# Patient Record
Sex: FEMALE | Race: OTHER | HISPANIC OR LATINO | Employment: PART TIME | URBAN - METROPOLITAN AREA
[De-identification: names, ages, dates, MRNs, and addresses within clinical notes are randomized per-mention and may not be internally consistent; named-entity substitution may affect disease eponyms.]

---

## 2019-10-08 ENCOUNTER — ULTRASOUND (OUTPATIENT)
Dept: OBGYN CLINIC | Facility: CLINIC | Age: 34
End: 2019-10-08

## 2019-10-08 VITALS — SYSTOLIC BLOOD PRESSURE: 109 MMHG | WEIGHT: 167 LBS | DIASTOLIC BLOOD PRESSURE: 75 MMHG | HEART RATE: 85 BPM

## 2019-10-08 DIAGNOSIS — Z3A.08 8 WEEKS GESTATION OF PREGNANCY: Primary | ICD-10-CM

## 2019-10-08 PROBLEM — N91.2 AMENORRHEA: Status: ACTIVE | Noted: 2019-10-08

## 2019-10-08 PROCEDURE — 99203 OFFICE O/P NEW LOW 30 MIN: CPT | Performed by: OBSTETRICS & GYNECOLOGY

## 2019-10-08 NOTE — PROGRESS NOTES
Amenorrhea  Viability scan performed  Single Intrauterine Gestation  Viable Pregnancy  CRL 14-15 mm, consistent with LMP    EDC 2020 base on LMP    RTC for nursing visit, subsequent prenatal H&P    Dante Marsh DO    Urdu  Used #[id number]      37year old  presents for viability scan  LMP 2019, reporting regular periods prior to conception  Denies using contraception prior to conception  Planned and desired pregnancy  Denies current nausea and vomiting of pregnancy  Vitals:    10/08/19 0908   BP: 109/75   Pulse: 85     Physical Exam   Constitutional: She is oriented to person, place, and time  She appears well-developed and well-nourished  No distress  Neurological: She is alert and oriented to person, place, and time  Skin: Skin is warm and dry  She is not diaphoretic  Psychiatric: She has a normal mood and affect   Her behavior is normal

## 2019-10-08 NOTE — PROGRESS NOTES
Note on use of High Level Disinfection Process (Trophon) for transvaginal probe:     William Hernandez    REF: [id number]   SN: [id number]     [address] [place] [address] [place] #[id number]

## 2019-10-08 NOTE — PATIENT INSTRUCTIONS
Un embarazo,   LO QUE NECESITA SABER:   Un embarazo normal dura alrededor de 40 semanas  El primer trimestre dura desde lozano último periodo hasta la semana 12 de Kathrene Loupe  El osito trimestre se extiende desde la semana 13 de lozano embarazo hasta la semana 23  El tercer trimestre se extiende desde la semana 24 de embarazo hasta que nazca lozano bebé  Si usted conoce la fecha de lozano último periodo, lozano médico puede calcular la fecha de nacimiento de lozano bebé  Es posible que usted de a ileana a lozano bebé en cualquier momento desde la semana 37 hasta 2 semanas después de la fecha calculada de [place]  INSTRUCCIONES SOBRE EL [facility]ITAL[facility]:   Regrese a la kierra de emergencias si:   · Usted presenta un ajay dolor de ny que no desaparece  · Usted tiene cambios en la visión nuevos o en aumento, ila visión borrosa o con manchas  · Usted tiene inflamación nueva o creciente en lozano ganesh o adriana  · Usted tiene dolor o cólicos en el abdomen o la parte baja de la espalda  · Usted tiene sangrado vaginal   Comuníquese con lozano médico o obstreta si:   · Usted tiene calambres, presión o tensión abdominal     · Usted tiene un cambio en la secreción vaginal     · Usted no puede retener alimentos ni líquidos y está perdiendo [place]  · Usted tiene escalofríos o fiebre  · Usted tiene comezón, ardor o dolor vaginal      · Usted tiene aysha secreción vaginal amarillenta, verdosa, rufus o de [employer]  · Usted tiene dolor o ardor al Janea Ru, orina menos de lo habitual o tiene [place] rosada o sanguinolenta  · Usted tiene preguntas o inquietudes acerca de lozano condición o cuidado  Medicamentos:   · Las vitaminas prenatales  suministran parte de las vitaminas y minerales adicionales que usted necesita bashir el Kathrene Loupe  Las vitaminas prenatales también podrían ayudar a disminuir el riesgo de ciertos defectos de nacimiento  · [place] ryan medicamentos ila se le haya indicado    Consulte con lozano médico si usted germán que lozano medicamento no le está ayudando o si presenta efectos secundarios  Infórmele si es alérgico a cualquier medicamento  Mantenga aysha lista actualizada de los [place], las vitaminas y los productos herbales que perla  Incluya los siguientes datos de los medicamentos: cantidad, frecuencia y motivo de administración  Traiga con usted la lista o los envases de la píldoras a ryan citas de seguimiento  Lleve la lista de los medicamentos con usted en jessica de aysha emergencia  Programe un control con lozano médico u obstetra según lo indicado:  Vaya a todas ryan citas prenatales bashir lozano embarazo  Anote ryan preguntas para que se acuerde de hacerlas bashir ryan visitas  Cambios corporales que pueden ocurrir bashir lozano embarazo:   · Los cambios en los senos  que usted Alfreida Moody sensibilidad y cosquilleo bashir la primera parte de lozano [facility]  Los senos se volverán más grandes  Es posible que necesite un sostén con soporte  Es posible que usted tammy Gabriella secreción delgada y FATOUMATA, conocida ila calostro, que sale de ryan pezones bashir el osito trimestre  El calostro es un líquido que se convertirá en [place] alrededor de 3 días después de usted davida dado a ileana  · Cambios en la piel y estrías  podrían ocurrir bashir lozano embarazo  Es posible que usted tenga marcas babin, conocidas ila estrías, en lozano piel  Las [employer] se desvanecen después del [facility]  Utilice crema si lozano piel está seca y con comezón  La piel de lozano ganesh, alrededor de los pezones y debajo de lozano ombligo podría oscurecerse  La mayoría del [place], lozano piel [place] a lozano color normal después del nacimiento de lozano bebé  · El malestar matutino  consiste en náuseas y vómitos que pueden ocurrir en cualquier momento del día  Evite los alimentos grasosos y picantes  Coma comidas pequeñas bashir el día en vez de porciones grandes  El jengibre puede ayudar a [employer]   Consulte con lozano médico acerca de otras formas para disminuir las náuseas y el vómito  · Acidez estomacal  puede ser causada por los cambios hormonales bashir lozano [facility]  [place] en crecimiento puede empujar lozano estómago hacia arriba y forzar ácido estomacal a acumularse dentro de lozano esófago  Tuscol[place] 4 o 5 comidas pequeñas cada día en vez de comidas grandes  Evite los alimentos picantes  Evite comer bart antes de irse a la cama  · Estreñimiento  puede desarrollarse bashir lozano embarazo  Para tratar el estreñimiento, coma alimentos altos en fibra ila cereales con fibra, frijoles, frutas, verduras, panes integrales y [place]  [place] de [place] regular y tome suficiente agua  Es posible que lozano médico sugiera un suplemento con fibra para ablandar ryan evacuaciones intestinales  Consulte con lozano médico antes de usar cualquier medicamento para disminuir el estreñimiento  · Las hemorroides  son [place] grandes en el área rectal  Pueden causar dolor, comezón y sangrado de color heaton vivo en lozano recto  Para disminuir el riesgo de hemorroides, prevenga el estreñimiento y no se esfuerce cuando tenga aysha evacuación intestinal  Si usted tiene hemorroides, sumérjase en aysha bañera con agua tibia para aliviar la incomodidad  Consulte con lozano médico cómo puede tratar las hemorroides  · Los calambres y la hinchazón en las piernas  pueden ser causados por niveles bajos de calcio o por el peso adicional del [facility]  Eleve ryan piernas por encima del nivel de lozano corazón para disminuir la hinchazón  Bashir un calambre en la pierna, estire o de un masaje al [employer] que tiene el calambre  El calor puede ayudar a disminuir el dolor y los espasmos musculares  Aplique calor sobre el músculo por 20 a 30 minutos cada 2 horas por la cantidad de días que se le indique  · Dolor en la espalda  puede ocurrir a medida que lozano bebé crece  No esté de pie por largos periodos de tiempo ni levante objetos pesados  Use aysha buena postura mientras esté de pie, se agache o se doble   Use zapatos de tacón bajo con un buen soporte  Descansar puede también ayudarla a aliviar el dolor de espalda  Pregunte a lozano médico acerca de ejercicios que usted pueda hacer para fortalecer los músculos de lozano espalda  Manténgase saludable bashir lozano embarazo:   · Consuma alimentos saludables y variados  Alimentos saludables incluyen frutas, verduras, panes de bindu integral, alimentos lácteos bajos en grasa, frijoles, efren magras y pescado  [place] líquidos ila se le haya indicado  Pregunte cuánto líquido debe carroll cada día y cuáles líquidos son los más adecuados para usted  Limite el consumo de cafeína a menos de [address]  Limite el consumo de pescado a 2 porciones cada semana  Escoja pescado con concentraciones bajas de edith ila atún ligero enlatado, camarón, cangrejo, salmón, bacalao o tilapia  No  coma pescado con concentraciones altas de edith ila pez nahum, caballa gigante, pargo rayado y tiburón  · [id number] [address]  Lozano necesidad de ciertas vitaminas y [address], ila el ácido fólico, aumenta bashir el [facility]  Las vitaminas prenatales proporcionan algunas de las vitaminas y minerales adicionales que usted necesita  Las vitaminas prenatales también podrían ayudar a disminuir el riesgo de ciertos defectos de nacimiento  · Pregunte cuánto peso usted debe aumentar bashir lozano embarazo  Demasiado aumento de peso o muy poco puede ser poco saludable para usted y lozano bebé  · Consulte con lozano médico acerca de hacer ejercicio  El ejercicio moderado puede ayudarla a mantenerse en forma  Lozano médico la ayudará a planear un programa de ejercicios que sea seguro para usted bashir lozano [place]  · No fume  Si usted fuma, nunca es demasiado tarde para dejar de hacerlo  Fumar aumenta el riesgo de aborto espontáneo y otros problemas de nadeem bashir lozano [place]  Fumar puede causar que lozano bebé nazca antes de tiempo o que pese menos al nacer   Solicite información a lozano médico si usted necesita ayuda para dejar de fumar  · No consuma alcohol  El alcohol pasa de lozano cuerpo al bebé a través de la placenta  Puede afectar el desarrollo del cerebro de lozano bebé y provocar el síndrome de alcoholismo fetal (SAF)  FAS es un johanne de condiciones que causan [address], de comportamiento y de crecimiento  · Consulte con lozano médico antes de carroll cualquier medicamento  Muchos medicamentos pueden perjudicar a lozano bebé si usted los perla [address]  No tome ningún medicamento, vitaminas, hierbas o suplementos sin jayy consultar con lozano Melvenia Pippins  use drogas ilegales o de la lawton (ila marihuana o cocaína) mientras está embarazada  Consejos de seguridad:   · Evite jacuzzis y saunas  No use un jacuzzi o un sauna mientras usted está embarazada, especialmente bashir el primer trimestre  Los [facility] y los saunas aumentan la temperatura de lozano bebé y el riesgo de defectos de nacimiento  · Evite la toxoplasmosis  [place] es aysha infección causada por comer carne cruda o estar cerca del excremento de un mini infectado  [place] puede causar malformaciones congénitas, aborto espontáneo y Mehran Andersonese las adriana después de tocar carne cruda  Asegúrese de que la carne esté yonatan cocida antes de comerla  Evite los huevos crudos y la Tomásdemond Townsend  Use guantes o pida que alguien la ayude a limpiar la caja de arena del mini mientras usted Lorna Szymanski  · Consulte con lozano médico acerca de viajar  El [address] cómodo para viajar es bashir el osito trimestre  Pregunte a lozano médico si usted puede viajar después de las 36 semanas  Es posible que no pueda viajar en avión después de las 36 [facility]  También le puede recomendar que evite largos viajes por erum  © 2017 [address] Information is for End User's use only and may not be sold, redistributed or otherwise used for commercial purposes   All illustrations and images included in CareNotes® are the copyrighted property of A D A M , Inc  or Slim Iqbal  Esta información es sólo para uso en educación  Lozano intención no es darle un consejo médico sobre enfermedades o tratamientos  Colsulte con lozano Anne-Marie Jeronimo farmacéutico antes de seguir cualquier régimen médico para saber si es seguro y efectivo para usted

## 2019-10-14 ENCOUNTER — APPOINTMENT (OUTPATIENT)
Dept: LAB | Facility: HOSPITAL | Age: 34
End: 2019-10-14

## 2019-10-14 ENCOUNTER — INITIAL PRENATAL (OUTPATIENT)
Dept: OBGYN CLINIC | Facility: CLINIC | Age: 34
End: 2019-10-14

## 2019-10-14 VITALS
SYSTOLIC BLOOD PRESSURE: 115 MMHG | BODY MASS INDEX: 31.75 KG/M2 | HEART RATE: 96 BPM | WEIGHT: 168.2 LBS | HEIGHT: 61 IN | DIASTOLIC BLOOD PRESSURE: 77 MMHG

## 2019-10-14 DIAGNOSIS — Z78.9 LANGUAGE BARRIER: Primary | ICD-10-CM

## 2019-10-14 DIAGNOSIS — Z86.59 HISTORY OF ANXIETY: ICD-10-CM

## 2019-10-14 DIAGNOSIS — O09.521 MULTIGRAVIDA OF ADVANCED MATERNAL AGE IN FIRST TRIMESTER: ICD-10-CM

## 2019-10-14 DIAGNOSIS — Z91.411: ICD-10-CM

## 2019-10-14 DIAGNOSIS — Z86.59 HISTORY OF DEPRESSION: ICD-10-CM

## 2019-10-14 DIAGNOSIS — Z78.9 LANGUAGE BARRIER: ICD-10-CM

## 2019-10-14 DIAGNOSIS — Z34.91 PREGNANT AND NOT YET DELIVERED IN FIRST TRIMESTER: ICD-10-CM

## 2019-10-14 DIAGNOSIS — Z3A.08 8 WEEKS GESTATION OF PREGNANCY: ICD-10-CM

## 2019-10-14 LAB
ABO GROUP BLD: NORMAL
BASOPHILS # BLD AUTO: 0.03 THOUSANDS/ΜL (ref 0–0.1)
BASOPHILS NFR BLD AUTO: 0 % (ref 0–1)
BILIRUB UR QL STRIP: NEGATIVE
BLD GP AB SCN SERPL QL: NEGATIVE
CLARITY UR: CLEAR
COLOR UR: YELLOW
EOSINOPHIL # BLD AUTO: 0.13 THOUSAND/ΜL (ref 0–0.61)
EOSINOPHIL NFR BLD AUTO: 2 % (ref 0–6)
ERYTHROCYTE [DISTWIDTH] IN BLOOD BY AUTOMATED COUNT: 12.9 % (ref 11.6–15.1)
GLUCOSE UR STRIP-MCNC: NEGATIVE MG/DL
HBV SURFACE AG SER QL: NORMAL
HCT VFR BLD AUTO: 36.4 % (ref 34.8–46.1)
HGB BLD-MCNC: 12.2 G/DL (ref 11.5–15.4)
HGB UR QL STRIP.AUTO: NEGATIVE
IMM GRANULOCYTES # BLD AUTO: 0.04 THOUSAND/UL (ref 0–0.2)
IMM GRANULOCYTES NFR BLD AUTO: 1 % (ref 0–2)
KETONES UR STRIP-MCNC: NEGATIVE MG/DL
LEUKOCYTE ESTERASE UR QL STRIP: NEGATIVE
LYMPHOCYTES # BLD AUTO: 1.68 THOUSANDS/ΜL (ref 0.6–4.47)
LYMPHOCYTES NFR BLD AUTO: 20 % (ref 14–44)
MCH RBC QN AUTO: 29.9 PG (ref 26.8–34.3)
MCHC RBC AUTO-ENTMCNC: 33.5 G/DL (ref 31.4–37.4)
MCV RBC AUTO: 89 FL (ref 82–98)
MONOCYTES # BLD AUTO: 0.48 THOUSAND/ΜL (ref 0.17–1.22)
MONOCYTES NFR BLD AUTO: 6 % (ref 4–12)
NEUTROPHILS # BLD AUTO: 6.2 THOUSANDS/ΜL (ref 1.85–7.62)
NEUTS SEG NFR BLD AUTO: 71 % (ref 43–75)
NITRITE UR QL STRIP: NEGATIVE
NRBC BLD AUTO-RTO: 0 /100 WBCS
PH UR STRIP.AUTO: 7 [PH]
PLATELET # BLD AUTO: 252 THOUSANDS/UL (ref 149–390)
PMV BLD AUTO: 10.8 FL (ref 8.9–12.7)
PROT UR STRIP-MCNC: NEGATIVE MG/DL
RBC # BLD AUTO: 4.08 MILLION/UL (ref 3.81–5.12)
RH BLD: POSITIVE
RPR SER QL: NORMAL
RUBV IGG SERPL IA-ACNC: 136.9 IU/ML
SP GR UR STRIP.AUTO: 1.03 (ref 1–1.03)
SPECIMEN EXPIRATION DATE: NORMAL
UROBILINOGEN UR QL STRIP.AUTO: 0.2 E.U./DL
WBC # BLD AUTO: 8.56 THOUSAND/UL (ref 4.31–10.16)

## 2019-10-14 PROCEDURE — 80081 OBSTETRIC PANEL INC HIV TSTG: CPT

## 2019-10-14 PROCEDURE — 81003 URINALYSIS AUTO W/O SCOPE: CPT

## 2019-10-14 PROCEDURE — 87086 URINE CULTURE/COLONY COUNT: CPT

## 2019-10-14 PROCEDURE — 36415 COLL VENOUS BLD VENIPUNCTURE: CPT

## 2019-10-14 PROCEDURE — 99211 OFF/OP EST MAY X REQ PHY/QHP: CPT

## 2019-10-14 NOTE — LETTER
Dentist Letter    Kimberlygladis Mohsen  1985  [id number] [id number]          10/14/19    We have had several requests from local dentist requesting permission to perform procedures on our patients who are pregnant  We wish to respond with this letter regarding some of the more routine procedures that we have been asked about  The following procedures may be performed on our obstetric patients:   1  Administration of local anesthesia   2  Administration of antibiotics such as PCN, Ampicillin, and Erythromycin  3  Administration of pain medications such as Tylenol, Tylenol with codeine, and if needed Percocet  4  Shielded X-rays    Should you have any questions, please do not hesitate to contact at [phone]          Sincerely,    [facility]  [phone]

## 2019-10-14 NOTE — PATIENT INSTRUCTIONS
Señales bashir el embarazo: Cuando llamar    1  Sangrado vaginal  2  Dolor abdominal rogelio que no desaparece  3  Fiebre (más de 100 4 y no se andrew con Tylenol)  4  Vómitos persistentes que mcclain más de 24 horas  5  dolor de pecho  6  Dolor o ardor al orinar  7  Dolor de ny severo que no se resuelve con Tylenol  8  Visión borrosa o cristi puntos en lozano visión  9  Hinchazón repentina de lozano ganesh o adriana  10  Enrojecimiento, hinchazón o dolor en aysha pierna  11  Un aumento de peso repentino en pocos días  12  Contar los movimientos fetales del bebé  (después de 28 semanas o el sexto mes de embarazo)  15  Aysha pérdida de líquido acuoso de la vagina: puede ser un chorro, un goteo o aysha humedad continua  14  Después de 20 semanas de embarazo, calambres rítmicos (más de 4 por hora) o menstruales ila dolor Jared Monday / Newt Sabot y Embarazo:  La siguiente lista de medicamentos de [place] generalmente se considera springer de carroll bashir el [facility]  Tenga cuidado de no duplicar los productos que contienen acetaminofén (Tylenol)  Resfriados / dolor de garganta   Robitussin DM - Simple (guaifenesina)   Aerosol nasal salino   Gárgaras de agua salada caliente   Cepacol pastillas para la garganta o enjuague bucal (cetilpiridinio)   Sucrets (hexylresoricinol)    Jann Roup LA D - o DESCONGESANTE   Claritin (loratadine)   Zrytec (cetirizina)   Allerga (fexofenadina)      Sabine de [place] / Annika Jorge Luis y molestias:   Tylenol (paracetamol) (acetaminophen)  NO exceda más de 3000 mg de Tylenol en un período de 24 horas        Acidez   Mylanta (hidróxido de aluminio / simeticona, hidróxido de magnesio)   Maalaox (hidróxido de aluminio y [place], hidróxido de magnesio)   Tums (carbonato de calcio)   Riopan (magaldrate)      Estreñimiento   Colace (docusato de sodio)   Surfak (docusato de sodio)   MiraLAX   Supositorios de glicerina   Flota enema (fosfato de sodio y bifosfato de sodio)  Náuseas vómitos   Vitamina B6 (piridoxina): puede carroll 50 mg a la hora de acostarse, 25 mg por la mañana, 25 mg por la tarde   Unisom (doxilamina): se puede usar para las náuseas / vómitos (star aysha tableta de 25 mg por la mitad)  Puede causar somnolencia  Dormir   Benadryl (difenhidramina): tome 1-2 tabletas según sea necesario antes de acostarse   Tableta Unisom (doxilamina) de 25 mg    Tableta de melatonina de 5 mg: según sea necesario antes de acostarse      En general, la forma genérica de la medicina suele ser más económica que la forma de elizabeth del [place]  Un embarazo  Lo qué necesita saber sobre el embarazo: Un embarazo normal dura alrededor de 40 semanas  El primer trimestre dura desde lozano último periodo hasta la semana 12 de [place]  El osito trimestre se extiende desde la semana 13 de lozano embarazo hasta la semana 23  El tercer trimestre se extiende desde la semana 24 de embarazo hasta que nazca lozano bebé  Si usted conoce la fecha de lozano último periodo, lozano médico puede calcular la fecha de nacimiento de lozano bebé  Es posible que usted de a ileana a lozano bebé en cualquier momento desde la semana 37 hasta 2 semanas después de la fecha calculada de [place]  Busque atención médica de inmediato si:   · Usted presenta un ajay dolor de ny que no desaparece  · Usted tiene cambios en la visión nuevos o en aumento, ila visión borrosa o con manchas  · Usted tiene inflamación nueva o creciente en lozano ganesh o adriana  · Usted tiene dolor o cólicos en el abdomen o la parte baja de la espalda  · Usted tiene sangrado vaginal   Comuníquese con lozano médico o obstreta si:   · Usted tiene calambres, presión o tensión abdominal   · Usted tiene un cambio en la secreción vaginal   · Usted no puede retener alimentos ni líquidos y está perdiendo [place]  · Usted tiene escalofríos o fiebre    · Usted tiene comezón, ardor o dolor vaginal    · Usted tiene aysha secreción vaginal amarillenta, verdosa, rufus o de [employer]  · Usted tiene dolor o ardor al Wen Peter, orina menos de lo habitual o tiene [place] rosada o sanguinolenta  · Usted tiene preguntas o inquietudes acerca de lozano condición o cuidado  Cambios corporales que pueden ocurrir bashir lozano embarazo:   · Los cambios en los senos  que usted Memory Curio sensibilidad y cosquilleo bashir la primera parte de lozano [facility]  Los senos se volverán más grandes  Es posible que necesite un sostén con soporte  Es posible que usted tammy Euel Sergo secreción delgada y FATOUMATA, conocida ila calostro, que sale de ryan pezones bashir el osito trimestre  El calostro es un líquido que se convertirá en [employer] alrededor de 3 días después de usted davida dado a ileana  · Cambios en la piel y estrías  podrían ocurrir bashir lozano embarazo  Es posible que usted tenga marcas babin, conocidas ila estrías, en lozano piel  Las [employer] se desvanecen después del [facility]  Utilice crema si lozano piel está seca y con comezón  La piel de lozano ganesh, alrededor de los pezones y debajo de lozano ombligo podría oscurecerse  La mayoría del [place], lozano piel [place] a lozano color normal después del nacimiento de lozano bebé  · El malestar matutino  consiste en náuseas y vómitos que pueden ocurrir en cualquier momento del día  Evite los alimentos grasosos y picantes  Coma comidas pequeñas bashir el día en vez de porciones grandes  El jengibre puede ayudar a [employer]  Consulte con lozano médico acerca de otras formas para disminuir las náuseas y el vómito  · Acidez estomacal  puede ser causada por los cambios hormonales bashir lozano [facility]  El [place] en crecimiento puede empujar lozano estómago hacia arriba y forzar ácido estomacal a acumularse dentro de lozano esófago  [place] 4 o 5 comidas pequeñas cada día en vez de comidas grandes  Evite los alimentos picantes  Evite comer bart antes de irse a la cama  · Estreñimiento  puede desarrollarse bashir lozano embarazo   Para tratar el estreñimiento, coma alimentos altos en fibra ila cereales con fibra, frijoles, frutas, verduras, panes integrales y [place]  Doe Plump de Maria Del Rosario regular y tome suficiente agua  Es posible que lozano médico sugiera un suplemento con fibra para ablandar ryan evacuaciones intestinales  Consulte con lozano médico antes de usar cualquier medicamento para disminuir el estreñimiento  · Las hemorroides  son [place] grandes en el área rectal  Pueden causar dolor, comezón y sangrado de color heaton vivo en lozano recto  Para disminuir el riesgo de hemorroides, prevenga el estreñimiento y no se esfuerce cuando tenga aysha evacuación intestinal  Si usted tiene hemorroides, sumérjase en aysha bañera con agua tibia para aliviar la incomodidad  Consulte con lozano médico cómo puede tratar las hemorroides  · Los calambres y la hinchazón en las piernas  pueden ser causados por niveles bajos de calcio o por el peso adicional del [facility]  Eleve ryan piernas por encima del nivel de lozano corazón para disminuir la hinchazón  Bashir un calambre en la pierna, estire o de un masaje al [employer] que tiene el calambre  El calor puede ayudar a disminuir el dolor y los espasmos musculares  Aplique calor sobre el músculo por 20 a 30 minutos cada 2 horas por la cantidad de días que se le indique  · Dolor en la espalda  puede ocurrir a medida que lozano bebé crece  No esté de pie por largos periodos de tiempo ni levante objetos pesados  Use aysha buena postura mientras esté de pie, se agache o se doble  Use zapatos de tacón bajo con un buen soporte  Descansar puede también ayudarla a aliviar el dolor de espalda  Pregunte a lozano médico acerca de ejercicios que usted pueda hacer para fortalecer los músculos de lozano espalda  Manténgase saludable bashir lozano embarazo:   · Consuma alimentos saludables y variados  Alimentos saludables incluyen frutas, verduras, panes de bindu integral, alimentos lácteos bajos en grasa, frijoles, efren magras y pescado   [place] líquidos ila se [employer] indicado  Pregunte cuánto líquido debe carroll cada día y cuáles líquidos son los más adecuados para usted  Limite el consumo de cafeína a menos de [place] [address]  Limite el consumo de pescado a 2 porciones cada semana  Escoja pescado con concentraciones bajas de edith ila atún ligero enlatado, camarón, cangrejo, salmón, bacalao o tilapia  No  coma pescado con concentraciones altas de edith ila pez nahum, caballa gigante, pargo rayado y tiburón  · [id number]  Lozano necesidad de ciertas vitaminas y [facility], ila el ácido fólico, aumenta bashir el Mary Ann Anis  Las vitaminas prenatales proporcionan algunas de las vitaminas y minerales adicionales que usted necesita  Las vitaminas prenatales también podrían ayudar a disminuir el riesgo de ciertos defectos de nacimiento  · Pregunte cuánto peso usted debe aumentar bashir lozano embarazo  Demasiado aumento de peso o muy poco puede ser poco saludable para usted y lozano bebé  · Consulte con lozano médico acerca de hacer ejercicio  El ejercicio moderado puede ayudarla a mantenerse en forma  Lozano médico la ayudará a planear un programa de ejercicios que sea seguro para usted bashir lozano Mary Ann Anis  · No fume  Si usted fuma, nunca es demasiado tarde para dejar de hacerlo  Fumar aumenta el riesgo de aborto espontáneo y otros problemas de nadeem bashir lozano Mary Ann Anis  Fumar puede causar que lozano bebé nazca antes de tiempo o que pese menos al nacer  Solicite información a lozano médico si usted necesita ayuda para dejar de fumar  · No consuma alcohol  El alcohol pasa de lozano cuerpo al bebé a través de la placenta  Puede afectar el desarrollo del cerebro de lozano bebé y provocar el síndrome de alcoholismo fetal (SAF)  FAS es un johanne de condiciones que causan [address], de comportamiento y de crecimiento  · Consulte con lozano médico antes de carroll cualquier medicamento    Muchos medicamentos pueden perjudicar a lozano bebé si usted los perla mientras está embarazada  No tome ningún medicamento, vitaminas, hierbas o suplementos sin jayy consultar con lozano Aicha Skeens  use drogas ilegales o de la lawton (ila marihuana o cocaína) mientras está embarazada  Consejos de seguridad:   · Evite jacuzzis y saunas  No use un jacuzzi o un sauna mientras usted está embarazada, especialmente bashir el primer trimestre  Los [facility] y los saunas aumentan la temperatura de lozano bebé y el riesgo de defectos de nacimiento  · Evite la toxoplasmosis  [place] es aysha infección causada por comer carne cruda o estar cerca del excremento de un mini infectado  [place] puede causar malformaciones congénitas, aborto espontáneo y Mehran Schein  Lávese las adriana después de tocar carne cruda  Asegúrese de que la carne esté yonatan cocida antes de comerla  Evite los huevos crudos y la Laverda Cosier  Use guantes o pida que alguien la ayude a limpiar la caja de arena del mini mientras usted Twyla Knows  · Consulte con lozano médico acerca de viajar  El [address] cómodo para viajar es bashir el osito trimestre  Pregunte a lozano médico si usted puede viajar después de las 36 semanas  Es posible que no pueda viajar en avión después de las 36 11 nVoq  También le puede recomendar que evite largos viajes por carretera  Programe un control con lozano médico u obstetra según lo indicado:  Vaya a todas ryan citas prenatales bashir lozano embarazo  Anote ryan preguntas para que se acuerde de hacerlas bashir ryan visitas  [place] y vómito en el embarazo   La náusea y el vómito en el embarazo  pueden suceder a cualquier hora del día  Estos síntomas usualmente comienzan antes de la semana 9 del embarazo y terminan para la semana 14 (osito trimestre)  Algunas mujeres pueden tener náusea o vómito por un tiempo prolongado  Estos síntomas pueden afectar a algunas mujeres bashir el embarazo  La náusea y el vómito no dañan a lozano bebé  Estos síntomas pueden dificultarle ryan actividades diarias  Busque atención médica de inmediato si:   · Usted presenta signos de deshidratación  Por ejemplo, orina de color amarillo oscuro, boca y labios resecos, piel reseca, latido cardíaco acelerado y orinar menos de lo normal   · Usted tiene dolor abdominal intenso  · Usted se siente demasiado débil o mareado ila para ponerse de pie  · Usted nota rebel en lozano vómito o en edith deposiciones  Pregúntele a lozano Yu Lento vitaminas y minerales son adecuados para usted  · Usted vomita más de 4 veces en 1 día  · Usted no ha podido retener líquidos en el estómago por más de 1 día  · Usted pierde más de 2 libras  · Usted tiene fiebre  · Edith náuseas y vómito continúan por más de 14 semanas  · Usted tiene preguntas o inquietudes acerca de lozano condición o cuidado  El tratamiento  para la náusea y el vómito en el embarazo generalmente no es necesario  Usted puede [employer] alimentos que come y en edith actividades para ayudar a controlar edith síntomas  Es posible que usted necesite probar varias cosas para determinar qué funciona mejor para usted  Hable con lozano médico si edith síntomas no mejoran con los cambios que se recomiendan a continuación  Es posible que usted necesite vitamina B6 y medicamento si estos cambios no ayudan o si edith síntomas se vuelven graves  Cambios de nutrición que usted puede realizar para controlar la náusea y el vómito:   · Coma porciones pequeñas bashir el día en vez de 3 comidas con porciones grandes  Es más probable que usted tenga náusea y vómito cuando lozano estómago está vacío  Consuma alimentos bajos en grasa y ricos en proteínas  Ejemplos son Keturah Brood, frijoles, pavo y larry sin piel  Mehran Schein, ila galletas saladas, cereal seco o un sandwich chico antes de [place]  · Coma galletas saladas o pan bal antes de levantarse de lozano cama por la mañana  Levántese de la cama lentamente  Los movimientos repentinos podrían provocarle mareos y [place]     · Consuma alimentos blandos cuando se sienta con náuseas  Ejemplos de alimentos blandos son el pan bal, cereal seco, pasta sin Jinger Sans y pan  Otros alimentos blandos incluyen a las [employer], plátanos, gelatina y pretzels  Evite los alimentos condimentados, grasosos y fritos  Evite otros alimentos que le provoquen náuseas  · [place] líquidos que contengan gengibre  [place] refresco de gengibre hecho con gengibre real o té de gengibre hecho con gengibre fresco rallado  Las [employer] o dulces de gengibre también podrían ayudar a aliviar la náusea y el vómito  · [place] líquidos entre alimentos en vez de tomarlos con los alimentos  Espere al menos 30 minutos después de comer para carroll líquidos  [place] cantidades pequeñas de líquidos con frecuencia bashir el día para evitar la deshidratación  Consulte cuál es la cantidad de líquido que usted debería consumir al día  Otros cambios que usted puede realizar para controlar la náusea y el vómito:   · Evite los olores que la [place]  Los olores dre podrían provocar que [employer] náuseas y el vómito, o podrían empeorarlo  Camine un poco, prenda un ventilador o trate de dormir con la ventana abierta para respirar aire fresco  Cuando esté cocinando, stephanie las ventanas para eliminar el olor que podría provocarle náuseas  · No se cepille ryan dientes inmediatamente después de comer  si eso le provoca náuseas  · Descanse cuando lo necesite  Comience aysha actividad lentamente y vuelva a lozano rutina normal conforme se empiece a sentir mejor  · Hable con olzano médico acerca de las vitaminas prenatales  Las vitaminas prenatales pueden provocar náuseas a algunas mujeres  Trate de tomárselas por la noche o con un bocadillo  Si erika cambio no le [place], lozano médico podría recomendarle un tipo de vitamina diferente     · No use ningún medicamento, vitamina o suplemento para controlar ryan síntomas sin antes consultarlo con lozano médico   Varios medicamentos pueden dañar a lozano bebé que no ha nacido  · El ejercicio de ligero a moderado  podría ayudar a aliviar ryan síntomas  También podría ayudarla a dormir mejor por la noche  Pregunte a lozano médico acerca del mejor plan de ejercicio para usted  Dieta para el embarazo   LO QUE NECESITA SABER:   ¿Qué es aysha dieta saludable bashir el Janece Lemme? [place] saludable bashir el embarazo es un plan alimenticio que proporciona la cantidad de calorías y nutrientes que usted necesita bashir el embarazo  El cuerpo necesita calorías y nutrientes adicionales para apoyar el desarrollo del bebé  Usted necesita aumentar la cantidad correcta de peso para tener un bebé y un embarazo saludables  Los bebés que nacen con un peso saludable tienen un riesgo blanca de ciertos problemas cardíacos al nacer y bashir lozano sj  Delanna Round saludable podría ayudarlo a evitar que aumente mucho de [place]  El aumento excesivo de peso le podría provocar problemas bashir el Janece Lemme y [place]  ¿Qué debería evitar bashir el Janece Lemme? · Alcohol:  No becki alcohol bashir el embarazo  El alcohol puede aumentar el riesgo de sufrir un aborto espontáneo (perder el bebé)  El bebé también podría nacer muy pequeño y Darlis Donath otros problemas de [place], ila problemas de aprendizaje bashir lozano sj  · Cafeína:  No se conoce cuales son Catha Cheli  Limite el consumo de cafeína para evitar posibles problemas de nadeem  La cafeína puede encontrase en el café, té, gaseosas, bebidas deportivas y chocolate  · Alimentos que contienen edith:  El edith se encuentra de Maria Del Rosario natural en kayy todos los tipos de pescados y mariscos  Algunos tipos de pescados absorben niveles más altos de edith que pueden ser nocivos para un bebé antes de nacer  Consuma solamente pescados y mariscos bajos en edith  Coma un francisco de 12 onzas a la semana de pescado o [employer] tengan un nivel bajo de edith   Entre éstos se incluyen los [place], el [place], [place] enlatado en agua, el salmón, el abadejo y el bagre  Coma sólo 6 onzas de atún dorado por semana  El atún albacora tiene más edith que el atún [place] be[place]  No  coma tiburón, pez nahum, caballa ni blanquillo  · Alimentos crudos o poco cocidos:  Usted no debería comer carne, aves, huevos, pescado o mariscos (camarón, cangrejo, langosta) crudos o poco cocidos  Cocine los alimentos sobrantes y listos para comer ila los hot dogs hasta que estén yonatan calientes  · Alimentos sin pasteurizar:  Los alimentos sin pasteurizar son aquéllos que no pasaron por el proceso de calentamiento (pasteurización) que destruye la bacteria  Usted no debe beber [place], [place] [address] que no haya sido pasteurizado  Estos [place] [employer], feta, [place], blue y [place]  ¿Qué alimentos se pueden comer bashir el embarazo? Consuma aysha variedad de alimentos de cada olga de los grupos que se enumeran abajo  El [place]'s dirá cuántas porciones de cada johanne usted debería comer diariamente para obtener las calorías suficientes  La cantidad de calorías que usted necesita depende de la actividad diaria, el peso antes del Vic y Ave actual  Los médicos dividen el embarazo en 3 periodos de tiempo que se conocen ila trimestres  En el primer trimestre, usualmente usted no necesita calorías adicionales  En el osito y tercer trimestre, la mayoría de las mujeres deberían consumir alrededor de 300 calorías adicionales cada día  · Frutas y verduras:  La mitad del plato debería contener frutas y verduras  · Frutas:  Seleccione frutas frescas, enlatadas o secas tan seguido ila sea posible  ¨ 1 taza de [place], picada, cocida o enlatada (enlatada en almíba o 100% de [facility])  ¨ Un durazno, naranja o plátano jada  ¨ ½ taza de fruta seca  ¨ 1 taza de jugo de fruta  ¨ Verduras:  Consuma mas verduras de color devora oscuro, heaton o anaranjado  Los vegetales devora oscuro incluyen la brócoli, [place] y repollo devora  Ejemplos de verduras de color anaranjado y heaton son las zanahorias, el camote, la calabaza de invierno, naranjas y chile heaton  ¨ 1 taza de vegetales cocidos o crudos  ¨ 1 taza de jugo de verduras  ¨ 2 tazas de hojas verdes crudas  · Granos:  La mitad de los granos que consume cada día deberían ser granos enteros  ¨ Granos integrales    ¨ ½ taza de arroz integral o eileen cocidos  ¨ 1 taza (1 onza) de cereal seco de grano entero  ¨ 1 rebanada de pan de 100% de grano entero o de mack  ¨ 3 tazas de palomitas de maíz  ¨ Otros granos:    ¨ ½ taza de arroz dorado o pasta cocidos  ¨ ½ de un pan inglés  ¨ 1 tortilla pequeña de harina o de maíz  ¨ 1 rosquilla pequeña  · Productos lácteos:  Elija productos lácteos sin grasa o bajos en grasa:  ¨ 1½ onzas de queso firme ([place], suizo, chedar)   ¨ 1 taza (8 onzas) de leche o yogurt sin o bajo en grasa  ¨ 1 taza de yogurt o pudín congelado bajo en grasa  · Carne y otras quiñones de proteínas:  Elija efren magras y de ave  [place], ase y cocine la carne a la dung en vez de freírla  Incluya aysha variedad de mariscos en lugar de algunas efren y aves cada semana  Consuma aysha variedad de alimentos con proteínas:  ¨ ½ onza de lakshmi secos (12 almendras, 24 pistachos, 7 mitades de nueces) o 1 cucharada de crema de cacahuate (1 onza)  ¨ ¼ de taza de soya tofu o tempeh (1 onza)  ¨ 1 huevo  ¨ ¼ de taza de frijoles, chícharos o lentejas cocidos (1 onza)  ¨ 1 pechuga de larry pequeña o 1 trucha pequeña (alrededor de 3 onzas)  ¨ 1 trozo de salmón (4 a 6 onzas)  ¨ 1 hamburguesa pequeña de carne magra (2 a 3 onzas)  · Grasas:  Limite las grasas saturadas, trans y el colesterol   Estas grasas no son saludables y se encuentran en la manteca, la [facility], la margarina de sepideh y en la grasa animal  Elija grasas saludables ila la poliinsaturada y la monoinsaturada:  ¨ 1 cucharada de aceite de [place], [place], [place], [place] o de soya  ¨ 1 cucharada de margarina suave  ¨ 1 cucharadita de mayonesa  ¨ 2 cucharadas de aderezo para ensaladas  ¨ ½ de un aguacate  ¿Qué suplementos vitamínicos y minerales podría necesitar? El médico le indicará si usted necesita un suplemento y qué tipo debería carroll  Hable con santiago médico antes de carroll cualquier otra clase de suplemento, incluyendo los herbales (naturales)  · Vitaminas prenatales:  Consuma aysha variedad de alimentos saludables, aun si usted está tomando vitaminas prenatales  Si olvida carroll la vitamina, no se tome el doble al siguiente día  · Ácido fólico:  Usted necesita al menos 421 mcg de ácido fólico por día antes de embarazarse  El ácido fólico ayuda a formar el cerebro y la médula do del bebé en el comienzo del Vic  Bashir el [place], santiago necesidad diaria de ácido fólico aumenta a alrededor de 600 mcg  Obtenga diariamente ácido fólico, consumiendo frutas y jugos cítricos, verduras de hojas verdes, hígado o frijoles secos  El ácido fólico también se agrega a ciertos [employer] cereales para el desayuno, los productos de guerin, las harinas y las pastas  · Jaky:  El jaky es un mineral que el cuerpo necesita para producir hemoglobina, que es aysha parte de los glóbulos rojos  La hemoglobina ayuda a que la rebel transporte oxígeno de los pulmones al joi del cuerpo  Los alimentos que son [place] buena lance de jaky son la carne, la carne de ave, pescado, frijoles, espinaca y cereales y panes fortificados  Santiago cuerpo absorberá el jaky de quiñones de carne, si tiene aysha lance de vitamina C al [employer]  Jess té y café lejos de alimentos fortificados con jaky y suplementos de jaky  Usted necesita alrededor de 30 miligramos de jaky cada día bashir el embarazo  · Calcio y vitamina D:  Las mujeres que no consumen productos lácteos pueden necesitar un suplemento de calcio y vitamina D  Hable con santiago médico sobre suplementos de calcio si no come regularmente buenas quiñones de calcio   La cantidad de calcio que usted necesita es de 1,300 mg si tiene entre 14 y 25 años de edad y de 1,000 mg si tiene entre 23 y 48 años de edad  ¿Qué cambios en la dieta podrían ayudar si tengo malestar matutino? El [address] matutino es común [employer] [place] [employer] primeros meses de [facility]  Usted podría sentirse con náuseas y vomitar varias veces al día  Para mejorar los síntomas del malestar matutino, coma poco y varias veces en vez de 3 comidas grandes  Los [employer] en carbohidratos ila las galletas saladas, el pan bal y la pasta podrían ser mas fáciles de comer  Jess líquidos [employer] comidas en vez de hacerlo con las comidas  ¿Qué cambios en la dieta podrían disminuir el estreñimiento? Blanquita dieta faith en fibra puede mejorar los síntomas de estreñimiento  Los cereales integrales para desayunar, los panes integrales y los jugos de [place] pasa son altos en Lizeth  Shardulcen Ike y verduras crudas, así ila los frijoles cocidos también son Colby Herb buena lance de Lizeth  También podría ayudar el aumentar el consumo de líquidos y realizar actividad física regularmente  Consulte con lozano médico antes de empezar un régimen de ejercicios  ¿Qué cambios en la dieta podrían disminuir la [place]? Para mejorar los síntomas de la [place], no se acueste después de comer  Cuando se acueste, duerma con la ny un poco elevada  Coma poco y con frecuencia, en vez de 3 comidas grandes  También podría ser de [facility] evitar la cafeína, el chocolate y las comidas condimentadas  ¿Cómo puedo obtener suficiente calcio si no puedo tolerar los productos lácteos? Si usted no puede beber [place] o consumir productos lácteos, trate la [place] sin lactosa o baja en lactosa, o la leche de soya fortificada con calcio  Pregunte a lozano médico acerca de píldoras que pueda carroll para ayudar a digerir los productos lácteos  Consuma otras bebidas y comidas que estén fortificadas con calcio, ila el [place]  ¿Qué otras pautas saludables sadie seguir?    · Vegetarianos y veganos:  Si usted es Lamont Gil, consuma suficiente proteína, vitamina B12 y jaky bashir el embarazo  Algunas quiñones de estos nutrientes que no son carne, son los cereales fortificados, la mantequilla de [place], productos de soya (tofu y [place] de soya), nueces, granos y legumbres  Estos nutrientes también se [employer] y los productos lácteos  · Antojos:  Usted podría tener antojos de ciertos alimentos bashir el [facility]  Los alimentos que son altos en calorías, grasa y azúcar, no deben reemplazar a los alimentos que son saludables  Algunas mujeres tienen antojos por sustancias inusuales ila el [place] nelson almidón para ropa, hielo y [place]  Esta condición se conoce ila pica  [place] podría conllevar a problemas de nadeem ila anemia y provocar otros [address] [place] [address] [address]  ¿Cuándo sadie comunicarme con mi médico?   · Usted pierde peso sin proponérselo  · Tiene antojos por sustancias ila el [place] nelson almidón para ropa o hielo  · Usted tiene preguntas o inquietudes acerca de lozano condición o cuidado  Embarazo de la semana 7 a la 10   Qué cambios están ocurriendo en lozano cuerpo:  La hormonas del embarazo podrían provocar que lozano cuerpo pase por varios cambios bashir esta etapa del [facility]  Es posible que usted se sienta más cansado de lo normal y que tenga cambios de humor, náuseas y vómitos, y jermain de [place]  Edith senos podrían sentirse sensibles e inflamados y usted podría orinar con más frecuencia  Busque atención médica de inmediato si:   · Usted tiene dolor o cólicos en el abdomen o la parte baja de la espalda  · Usted tiene sangrado vaginal abundante o coágulos  · Le sale un material que parece tejido o coágulos grandes  Recolecte el material y tráigalo con usted  Pregúntele a lozano Jerelyn Jelly vitaminas y minerales son adecuados para usted  · Usted tiene un sangrado leve  · Usted tiene escalofríos o fiebre    · Usted tiene comezón, ardor o dolor vaginal    · Usted tiene aysha secreción vaginal amarillenta, verdosa, rufus o de [employer]  · Usted tiene dolor o ardor al [place], orina menos de lo habitual o tiene [place] rosada o sanguinolenta  · Usted tiene preguntas o inquietudes acerca de lozano condición o cuidado  Cómo cuidarse en esta etapa de lozano embarazo:   · Controle la náusea y el vómito  Evite los alimentos grasosos y picantes  Coma comidas pequeñas bashir el día en vez de porciones grandes  El jengibre puede ayudar a [employer]  Consulte con lozano médico acerca de otras formas para disminuir las náuseas y el vómito  · Consuma alimentos saludables y variados  Alimentos saludables incluyen frutas, verduras, panes de bindu integral, alimentos lácteos bajos en grasa, frijoles, efren magras y pescado  [place] líquidos ila se le haya indicado  Pregunte cuánto líquido debe carroll cada día y cuáles líquidos son los más adecuados para usted  Limite el consumo de cafeína a menos de [address]  Limite el consumo de pescado a 2 porciones cada semana  Escoja pescado con concentraciones bajas de edith ila atún al natural enlatado, camarón, salmón, bacalao o tilapia  No  coma pescado con concentraciones altas de edith ila pez nahum, caballa gigante, pargo rayado y tiburón  · [address] [place]  Lozano necesidad de ciertas vitaminas y [address], ila el ácido fólico, aumenta bashir el [facility]  Las vitaminas prenatales proporcionan algunas de las vitaminas y minerales adicionales que usted necesita  Las vitaminas prenatales también podrían ayudar a disminuir el riesgo de ciertos defectos de nacimiento  · Pregunte cuánto peso usted debería aumentar cada mes  Demasiado aumento de peso o muy poco puede ser poco saludable para usted y lozano bebé  · No fume  Si usted fuma, nunca es demasiado tarde para dejar de hacerlo  Fumar aumenta el riesgo de aborto espontáneo y otros problemas de nadeem bashir lozano [place]   Fumar puede causar que lozano bebé nazca antes de tiempo o que pese menos al nacer  Solicite información a lozano médico si usted necesita ayuda para dejar de fumar  · No consuma alcohol  El alcohol pasa de lozano cuerpo al bebé a través de la placenta  Puede afectar el desarrollo del cerebro de lozano bebé y provocar el síndrome de alcoholismo fetal (SAF)  FAS es un johanne de condiciones que causan [address], de comportamiento y de crecimiento  · Consulte con lozano médico antes de carroll cualquier medicamento  Muchos medicamentos pueden perjudicar a lozano bebé si usted los perla [address]  No tome ningún medicamento, vitaminas, hierbas o suplementos sin jayy consultar con lozano Garold Chun  use drogas ilegales o de la lawton (ila marihuana o cocaína) mientras está embarazada  Consejos de seguridad bashir el embarazo:   · Evite jacuzzis y saunas  No use un jacuzzi o un sauna mientras usted está embarazada, especialmente bashir el primer trimestre  Los [facility] y los saunas aumentan la temperatura de lozano bebé y el riesgo de defectos de nacimiento  · Evite la toxoplasmosis  [place] es aysha infección causada por comer carne cruda o estar cerca del excremento de un mini infectado  [place] puede causar malformaciones congénitas, aborto espontáneo y Mehran Schein  Lávese las adriana después de tocar carne cruda  Asegúrese de que la carne esté yonatan cocida antes de comerla  Evite los huevos crudos y la Gertaty Jones  Use guantes o pida que alguien la ayude a limpiar la caja de arena del mini mientras usted [place]  Cambios que están ocurriendo con lozano bebé:  Para las 10 semanas, lozano bebé medirá alrededor de 2 ½ pulgadas desde la todd hasta la rabadilla (parte inferior o cóccix del bebé)  Lozano bebé pesa alrededor de ½ onza  Los [employer] del cuerpo, ila el cerebro, corazón y pulmones se están formando  Las características faciales de lozano bebé también están comenzando a formarse    Lo que necesita saber acerca del cuidado prenatal: El cuidado prenatal se trata de aysha serie de visitas con lozano médico a lo franny del embarazo  Bashir las primeras 28 semanas de lozano [place], usted tendrá citas mensuales con lozano médico  El cuidado prenatal puede ayudar a evitar problemas bashir el [facility] y [place]  Lozano médico le hará preguntas acerca de lozano nadeem y de cualquier embarazo previo que usted haya tenido  Él también le preguntará acerca de cualquier medicamento que esté tomando  Es posible que también necesite alguno de los siguientes tratamientos:  · Aysha prueba de [place]  se realiza para revisar si lozano felipe uterino tiene células anormales  El felipe uterino es la apertura angosta que está en la parte inferior de [place]  El felipe uterino se junta con la parte superior de la vagina  · Un examen pélvico  le permite a lozano médico observar lozano felipe uterino (la parte inferior de lozano [place])  Lozano médico utiliza un espéculo para abrir lozano vagina suavemente  Él examinará el tamaño y forma de lozano útero  · Los análisis de rebel:  podrían realizarse para revisar signos de anemia o el tipo de [place]  Lozano médico también podría ordenarle otros exámenes de rebel para revisar si usted es inmune a ciertas enfermedades ila la Hepatitis B  También podría recomendarle un examen del VIH  · Análisis de orina  también podrían realizarse para revisar si hay signos de infección  · Lozano presión arterial y peso  será revisado  El embarazo de la semana 11 a la 14   Cambios que están ocurriendo con lozano bebé: Lozano bebé tiene completamente formadas las uñas de ryan adriana y pies  Ahora lozano latido se puede escuchar  Pregunte a lozano médico si usted puede escuchar el latido cardíaco de lozano bebé  Para la semana 14, lozano bebé mide más de 4 pulgadas desde la punta de la ny hasta la rabadilla (parte inferior del bebé)  Lozano bebé pesa más de 3 onzas     Lo que necesita saber acerca del cuidado prenatal:  Bashir las primeras 28 semanas de lozano embarazo, ted tendrá citas mensuales con lozano Delbra Blank prenatal puede ayudar a evitar problemas bashir [place] y [place]  Lozano médico le revisará lozano presión arterial y [place]  Es posible que también necesite alguno de los siguientes tratamientos:  · Un examen de orina  también podría realizarse para revisarle el azúcar y la proteína  Estas son señales de diabetes gestacional o de infección  · Se le pueden ofrecer  pruebas de detección de trastornos genéticos  Estos exámenes de detección revisan el riesgo de lozano bebé de trastornos genéticos ila el síndrome de [place]  Los exámenes de detección incluyen un examen de rebel y un ultrasonido  · El ritmo cardíaco de lozano bebé  será revisado  Vacuna de refuerzo contra la [place]/tos ferina/tétanos (Por inyección)   Protege contra las infecciones causadas por el tétanos (trismo), la difteria o la pertusis (tos [place])  Esta es aysha vacuna de refuerzo  Jn(s) : Adacel, [employer]   Existen muchas otras marcas de [employer]  Erika medicamento no debe ser usado cuando:   Usted no debe recibir esta vacuna si alguna vez ha tenido aysha reacción alérgica a la vacuna contra el tétanos, la difteria o tos ferina por separado o en combinación  Usted no debe recibir esta vacuna si ha tenido convulsiones, cambios del [place] mental o cualquier otra reacción grave dentro de los 7 días de davida recibido aysha vacuna contra la tos [place]  Forma de usar erika medicamento:   Inyectable  · Juanita Bullion enfermera u otro médico le administrará erika medicamento  · Lozano médico le recetará lozano dosis exacta y le indicará la frecuencia con la que debe administrarse  Erika medicamento se administra mediante aysha inyección en olga de ryan músculos  · Usted podría recibir otras vacunas al mismo tiempo que reciba Romana, tuan en aysha rustam distinta del cuerpo  Usted debe recibir las instrucciones para el paciente para todas las vacunas  Coméntele a lozano médico o enfermera cualquier pregunta que tenga al respecto    · Sasha y Leo Carroll instrucciones para el paciente que vienen con el medicamento  Hable con lozano médico o farmacéutico si tiene alguna pregunta  Medicamentos y [employer] que debe evitar:   Consulte con lozano médico o farmacéutico antes de usar cualquier medicamento, incluyendo los que compra sin receta médica, las vitaminas y los productos herbales  · Asegúrese de informarle a lozano médico si usted está recibiendo un tratamiento o medicamento que debilita lozano sistema inmunológico  Ésto incluye la radioterapia, medicamentos esteroides (ila dexametasona, hidrocortisona, metilprednisolona, prednisolona, prednisona, Medrol®), o medicamentos contra el cáncer  Precauciones bashir el uso de erika medicamento:   · Asegúrese que lozano médico sepa si usted está embarazada o lactando o sufre de epilepsia, un sistema inmunológico débil o un antecedente de un derrame cerebral  Informe a lozano médico si usted está enfermo o tiene fiebre  · Avísele a lozano médico acerca de cualquier reacción que usted tenga después de davida recibido aysha vacuna  Ésto incluye desmayos, convulsiones, aysha fiebre que sobrepasa los 40 5º C (105° F), o enrojecimiento o inflamación severa donde se le aplicó la inyección  Dígale a lozano médico si usted tiene antecedentes del síndrome de Guillain-Barré después de davida recibido aysha vacuna antitetánica  · Llame a lozano médico inmediatamente si usted se desmaya o tiene [employer] vista, entumecimiento o cosquilleo en ryan brazos, adriana o pies o sufre aysha convulsión después de recibir esta vacuna  · Informe a lozano médico si tiene aysha alergia al látex  Las jeringas pueden que contengan caucho de látex natural seco   · Esta vacuna no sirve para tratar [employer]  Si usted tiene Pitney Mateo de difteria, tétanos, o tos ferina, necesitará un medicamento para tratar la infección    Efectos secundarios que pueden presentarse bashir el uso de erika medicamento:   Consulte inmediatamente con el médico si nota cualquiera de estos efectos secundarios:  · Reacción alérgica: Comezón o ronchas, hinchazón del jerry o las adriana, hinchazón u hormigueo en la boca o garganta, opresión en el pecho, dificultad para respirar  · Cambios en la visión  · Fiebre por encima de los 39 4 grados C (105 grados F)  · Desvanecimientos o desmayos  · Pérdida de la sensación, hormigueo o ardor en las adriana, los brazos, las piernas o los pies  · Convulsiones  · [facility] entumecimiento o debilidad en ryan brazos o piernas  · Dolor intenso, enrojecimiento o inflamación donde le aplicaron la inyección  Consulte con el médico si nota los siguientes efectos secundarios menos graves:   · Dolor de ny  · Dolor moderado, enrojecimiento o inflamación donde le aplicaron la inyección  · Street, vómito, diarrea o dolor de estómago  · Cansancio  Consulte con el médico si nota otros efectos secundarios que germán son causados por erika medicamento  Llame a lozano médico para consultarle Armida Montoya puede notificar ryan efectos secundarios al [facility] al [phone]  Vacuna contra la gripe   La vacuna contra la influenza  es aysha inyección que se aplica para ayudar en la prevención de la influenza (gripe)  La influenza es causada por un virus  El virus se propaga de persona a persona por medio de la tos y los estornudos  Varios tipos de virus causan la influenza  Debido a que los virus [place] con el [place], es necesaria la producción de nuevas vacunas cada año  La vacuna comienza la protección contra la influenza aproximadamente 2 semanas después de recibirla  La vacuna antigripal suele inyectarse en el brazo  Podría aplicarse en lozano muslo  Es posible que le administren aysha vacuna hecha con virus débiles o muertos  Llame al 911 en jessica de presentar lo siguiente:   · Lozano boca y garganta están inflamadas  · Usted tiene sibilancias o dificultad para respirar  · Usted tiene dolor en el pecho o lozano corazón está latiendo más rápido de lo que es normal para usted    · Usted siente que se va a desmayar  Busque atención médica de inmediato si:   · Lozano jerry está heaton o inflamado  · Usted tiene urticaria que se propaga por todo lozano cuerpo  · Usted se siente débil o mareado  Pregúntele a lozano Selene Hung vitaminas y minerales son adecuados para usted  · Usted tiene [employer], enrojecimiento o inflamación alrededor del área donde le aplicaron la inyección  · Usted tiene preguntas o inquietudes sobre la vacuna contra la influenza  Cuándo ponerse la vacuna contra la influenza:  La vacuna antigripal se ofrece cada año empezando en septiembre u octubre  Se recomienda ser vacunado contra la gripe tan pronto esté disponible  Los [employer] 6 meses y 6 años de edad deben recibir 2 dosis bashir el primer año después de davida recibido la vacuna  Las 2 dosis deben recibirse con aysha diferencia de 4 semanas ila mínimo  Es mejor si se aplica el mismo tipo de vacuna ambas veces  Luego, el jo puede recibir 1 dosis [employer]  Los niños de 9 años o mayores deben recibir 1 dosis [employer]  Critical access hospital recibir la vacuna contra la influenza:   · Bebés de 6 meses o más  · Cualquier adulto saludable a quien le gustaría reducir el riesgo de contraer la gripe  · Cualquier persona que vive con, o provee cuidado a niños menores de 5 años de edad   · Personal en el [place] de la nadeem  · Cualquier persona que viva en centros de convalecencia de franny plazo  · Cualquier persona que sufra de problemas de nadeem crónicos, ila asma, diabetes, o trastornos en la rebel  · Cualquier persona con un sistema inmunitario débil  · Mujeres embarazadas o que están planificando quedar embarazadas bashir la temporada de la gripe  Carly Binder no deben recibir la vacuna contra la influenza:  Si tiene alergia a los [place], pregúntele a lozano médico si es seguro recibir la inyección contra la influenza  Un médico deberá controlarlo de cerca mientras recibe la vacuna y bashir aysha hora o más tras haberla recibido   Carly Binder no deben recibir la vacuna contra la influenza:  · Bebés menores de 6 meses   · Cualquier persona que haya sufrido aysha reacción alérgica a la vacuna contra la gripe  · Cualquier persona que esté enferma o tenga fiebre  · Cualquier persona que haya recibido un diagnóstico del síndrome de Guillain-Wilber dentro de las primeras 6 semanas de davida recibido aysha vacuna contra la gripe  · Cualquier persona alérgica al timerosal (edith)  Los riesgos de la vacuna contra la gripe:  La vacuna contra la influenza podría causar síntomas leves, ila fiebre, dolor de [place] y jermain musculares  También es posible que provoque sensibilidad o enrojecimiento de leve a moderado en el área donde le aplicaron la inyección  El aerosol nasal podría causar fiebre, congestión o flujo nasal, dolor de ny, jermain musculares o vómito  Usted aún podría contraer la gripe después de recibir la vacuna contra la influenza  Si usted es alérgico a los [place], pregunte acerca de aysha vacuna sin huevo  Usted podría tener aysha reacción alérgica a la vacuna  [place] puede poner en peligro lozano sj  Acuda a ryan consultas de control con lozano médico según le indicaron  Anote ryan preguntas para que se acuerde de hacerlas bashir ryan visitas

## 2019-10-14 NOTE — LETTER
Proof of Pregnancy Letter    Kari Cha  1985  [address]        10/14/19      Kari Cha is a patient at our facility  Kari Cha Estimated Date of Delivery: 05/19/2020  Any questions or concerns, please feel free to contact our office      Sincerely,     [facility]   [address]   [facility], [address] [place] [address]   [phone]

## 2019-10-14 NOTE — LETTER
[facility] Letter    Quyen Garcia  1985  [address]       10/14/19          Quyen Garcia is a patient and under our care in our office  Evila Reyes's Estimated Date of Delivery: 05/19/2020  Any questions or concerns feel free to contact our office       Thank you,    [address] [facility],[address]  [id number] [facility]  [facility]/Tomasz MoralesAbrazo [place] Camp[place]  [phone]

## 2019-10-14 NOTE — LETTER
Work Letter    Debbie Childs  1985  [address]    Dear Debbie Childs,      10/14/19        Your employee is a patient at [facility]  We recommend that all pregnant women:    1  Have a well-ventilated workspace  2  Wear low-heeled shoes  3  Work no more than 40 hours per week  4  Have a 15 minute break every 2 hours and at least 30 minutes for a meal break  5  Use good body mechanics by bending at your knees to avoid back strain and lift no more than 20 pounds without assistance  Will need assistance with lifting over 20 lbs  6  Have ready access to bathrooms and water  She may continue to work until her due date unless medical complications arise  We anticipate she may return to work in 6-8 weeks after delivery       Sincerely,    [facility]
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
## 2019-10-15 LAB
BACTERIA UR CULT: ABNORMAL
HIV 1+2 AB+HIV1 P24 AG SERPL QL IA: NORMAL

## 2019-10-28 ENCOUNTER — ROUTINE PRENATAL (OUTPATIENT)
Dept: OBGYN CLINIC | Facility: CLINIC | Age: 34
End: 2019-10-28

## 2019-10-28 ENCOUNTER — PATIENT OUTREACH (OUTPATIENT)
Dept: OBGYN CLINIC | Facility: CLINIC | Age: 34
End: 2019-10-28

## 2019-10-28 VITALS
WEIGHT: 168 LBS | HEART RATE: 90 BPM | BODY MASS INDEX: 31.72 KG/M2 | HEIGHT: 61 IN | SYSTOLIC BLOOD PRESSURE: 113 MMHG | DIASTOLIC BLOOD PRESSURE: 79 MMHG

## 2019-10-28 DIAGNOSIS — Z23 NEED FOR INFLUENZA VACCINATION: ICD-10-CM

## 2019-10-28 DIAGNOSIS — Z72.51 HIGH RISK HETEROSEXUAL BEHAVIOR: Primary | ICD-10-CM

## 2019-10-28 DIAGNOSIS — Z11.3 SCREEN FOR STD (SEXUALLY TRANSMITTED DISEASE): ICD-10-CM

## 2019-10-28 DIAGNOSIS — Z34.91 PRENATAL CARE IN FIRST TRIMESTER: ICD-10-CM

## 2019-10-28 DIAGNOSIS — Z3A.10 10 WEEKS GESTATION OF PREGNANCY: ICD-10-CM

## 2019-10-28 LAB
SL AMB  POCT GLUCOSE, UA: NORMAL
SL AMB POCT KETONES,UA: NORMAL
SL AMB POCT URINE PROTEIN: NORMAL

## 2019-10-28 PROCEDURE — G0124 SCREEN C/V THIN LAYER BY MD: HCPCS | Performed by: PATHOLOGY

## 2019-10-28 PROCEDURE — 87624 HPV HI-RISK TYP POOLED RSLT: CPT | Performed by: OBSTETRICS & GYNECOLOGY

## 2019-10-28 PROCEDURE — G0145 SCR C/V CYTO,THINLAYER,RESCR: HCPCS | Performed by: PATHOLOGY

## 2019-10-28 PROCEDURE — 90686 IIV4 VACC NO PRSV 0.5 ML IM: CPT | Performed by: OBSTETRICS & GYNECOLOGY

## 2019-10-28 PROCEDURE — 81002 URINALYSIS NONAUTO W/O SCOPE: CPT | Performed by: OBSTETRICS & GYNECOLOGY

## 2019-10-28 PROCEDURE — 90471 IMMUNIZATION ADMIN: CPT | Performed by: OBSTETRICS & GYNECOLOGY

## 2019-10-28 PROCEDURE — 87491 CHLMYD TRACH DNA AMP PROBE: CPT | Performed by: OBSTETRICS & GYNECOLOGY

## 2019-10-28 PROCEDURE — 87591 N.GONORRHOEAE DNA AMP PROB: CPT | Performed by: OBSTETRICS & GYNECOLOGY

## 2019-10-28 PROCEDURE — 99214 OFFICE O/P EST MOD 30 MIN: CPT | Performed by: OBSTETRICS & GYNECOLOGY
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
## 2019-10-29 LAB
C TRACH DNA SPEC QL NAA+PROBE: NEGATIVE
HPV HR 12 DNA CVX QL NAA+PROBE: POSITIVE
HPV16 DNA CVX QL NAA+PROBE: NEGATIVE
HPV18 DNA CVX QL NAA+PROBE: NEGATIVE
N GONORRHOEA DNA SPEC QL NAA+PROBE: NEGATIVE

## 2019-11-01 LAB
LAB AP GYN PRIMARY INTERPRETATION: ABNORMAL
Lab: ABNORMAL
PATH INTERP SPEC-IMP: ABNORMAL

## 2019-11-25 ENCOUNTER — PROCEDURE VISIT (OUTPATIENT)
Dept: OBGYN CLINIC | Facility: CLINIC | Age: 34
End: 2019-11-25

## 2019-11-25 VITALS
SYSTOLIC BLOOD PRESSURE: 99 MMHG | BODY MASS INDEX: 30.73 KG/M2 | WEIGHT: 167 LBS | HEIGHT: 62 IN | DIASTOLIC BLOOD PRESSURE: 60 MMHG

## 2019-11-25 DIAGNOSIS — R87.612 LGSIL ON PAP SMEAR OF CERVIX: Primary | ICD-10-CM

## 2019-11-25 PROBLEM — Z3A.14 14 WEEKS GESTATION OF PREGNANCY: Status: ACTIVE | Noted: 2019-11-25

## 2019-11-25 PROCEDURE — 88305 TISSUE EXAM BY PATHOLOGIST: CPT | Performed by: PATHOLOGY

## 2019-11-25 PROCEDURE — 57455 BIOPSY OF CERVIX W/SCOPE: CPT | Performed by: OBSTETRICS & GYNECOLOGY

## 2019-11-25 PROCEDURE — 88344 IMHCHEM/IMCYTCHM EA MLT ANTB: CPT | Performed by: PATHOLOGY

## 2019-11-25 NOTE — PROGRESS NOTES
Colposcopy  Date/Time: 11/25/2019 5:16 PM  Performed by: Meredith Arguelles MD  Authorized by: Meredith Arguelles MD     Consent:     Consent obtained:  Verbal and written    Consent given by:  Patient    Procedural risks discussed:  Bleeding, damage to other organs, failure rate, infection, repeat procedure and possible continued pain    Patient questions answered: yes      Patient agrees, verbalizes understanding, and wants to proceed: yes    Pre-procedure:     Pre-procedure timeout performed: yes      Prepped with: acetic acid    Indication:     Indication:  LSIL  Procedure:     Procedure: Colposcopy w/ biopsy of cervix      Under satisfactory analgesia the patient was prepped and draped in the dorsal lithotomy position: yes      [place] speculum was placed in the vagina: yes      Under colposcopic examination the transition zone was seen in entirety: yes      Intracervical block was performed: no      Cervical biopsy performed with a cervical biopsy punch: yes      Tampon inserted: no      Monsel's solution was applied: no      Biopsy(s): yes      Location:  12, 6    Specimen to pathology: yes    Post-procedure:     Findings: White epithelium      Impression: Low grade cervical dysplasia      Patient tolerance of procedure: Tolerated well, no immediate complications  Comments:      RTC IN 2 WEEKS FOR RESULTS  FHT 160BPM  Patient is well appearing and without any current complaints

## 2019-11-26 ENCOUNTER — PATIENT OUTREACH (OUTPATIENT)
Dept: OBGYN CLINIC | Facility: CLINIC | Age: 34
End: 2019-11-26

## 2019-11-26 NOTE — PROGRESS NOTES
Late entry  SW met with Pt on 11/25 for follow up  Pt reported she is doing well  Denies depression signs  Pt was somewhat anxious due to colposcopy  Brief counseling given  Pt will contact SW as needed

## 2019-12-09 ENCOUNTER — ROUTINE PRENATAL (OUTPATIENT)
Dept: OBGYN CLINIC | Facility: CLINIC | Age: 34
End: 2019-12-09

## 2019-12-09 VITALS
HEIGHT: 61 IN | DIASTOLIC BLOOD PRESSURE: 68 MMHG | WEIGHT: 169 LBS | SYSTOLIC BLOOD PRESSURE: 103 MMHG | HEART RATE: 96 BPM | BODY MASS INDEX: 31.91 KG/M2

## 2019-12-09 DIAGNOSIS — Z3A.16 16 WEEKS GESTATION OF PREGNANCY: Primary | ICD-10-CM

## 2019-12-09 PROCEDURE — 99213 OFFICE O/P EST LOW 20 MIN: CPT | Performed by: OBSTETRICS & GYNECOLOGY

## 2019-12-09 NOTE — PROGRESS NOTES
OB/GYN prenatal visit    S: 37 y o  Gabriela Tam here for PN visit  She has no obstetric complaints, including pelvic pain, contractions, vaginal bleeding, loss of fluid, or decreased fetal movement  Patient is well appearing and without any current complaints  She is here for colpo results  O:  Vitals:    19 1600   BP: 103/68   Pulse: 96       Physical Examination:   General appearance - alert, well appearing, and in no distress and oriented to person, place, and time  Chest - clear to auscultation, no wheezes, rales or rhonchi, symmetric air entry  Heart - normal rate and regular rhythm, no murmurs noted, no gallops noted  Abdomen - soft, nontender, nondistended, no masses or organomegaly  Musculoskeletal - no joint tenderness, deformity or swelling  Extremities - peripheral pulses normal, no pedal edema, no clubbing or cyanosis    FHR: 141bpm    A/P:  Lei Pass 37 y o   16w6d presenting with colpo results  Pap LSIL HRHPV (16, 18 negative)  Colpo: AGGIE-1    Repeat cotest in 1 year      Bhavesh Kwan MD  2019  4:41 PM

## 2019-12-11 ENCOUNTER — TELEPHONE (OUTPATIENT)
Dept: PERINATAL CARE | Facility: CLINIC | Age: 34
End: 2019-12-11

## 2019-12-11 NOTE — TELEPHONE ENCOUNTER
[facility] called to confirm we had patient's new address  Pt states she did not receive NP packet  I informed that her appt for 12/31 had to be rs  I tried calling pt again today and was unable to due to phone not taking any messages

## 2019-12-26 NOTE — PATIENT INSTRUCTIONS
Thank you for choosing us for your  care today  If you have any questions about your ultrasound or care, please do not hesitate to contact us or your primary obstetrician  Some general instructions for your pregnancy are:     Exercise: we encourage most pregnant women to get regular physical activity in pregnancy  Exercise has been shown to reduce the risk of several pregnancy-related complications  Unless instructed otherwise, you can aim for 22 minutes per day (150 minutes per week! )   Nutrition: aim for calcium-rich and iron-rich foods as well as healthy sources of protein   Weight: ask your doctor what is the appropriate amount of weight for you to gain in pregnancy  We have nutritionists here if you would like to meet with them   Protection from influenza: get yourself and your entire household vaccinated against influenza  Tell your partner to get vaccinated as well  Good hand hygiene can reduce the spread of this potentially deadly virus  Insist that everyone who is going to hold or be around your baby get vaccinated   Educate yourself about preeclampsia: preeclampsia is a common, serious complication in pregnancy  A blood pressure of 140mmHg (top number or systolic) OR 84GLTV (bottom number or diastolic) is elevated and needs evaluation by your doctor  Ask your doctor early in pregnancy if you should take aspirin (not motrin or tylenol) to prevent preeclampsia  If you were advised to take aspirin to prevent preeclampsia, a daily dose of 162mg or 81mg is advised  One resource to learn more is www  preeclampsia org    If you smoke, try to reduce how many cigarettes you smoke or quit completely  Do not vape       Other warning signs to watch out for in pregnancy or postpartum: chest pain, obstructed breathing or shortness of breath, seizures, thoughts of hurting yourself or your baby, bleeding, a painful or swollen leg, fever, or headache (AWHONN POST-BIRTH Warning Signs campaign)  If these happen call 911  Itching is also not normal in pregnancy and if you experience this, especially over your hands and feet, potentially worse at night, notify your doctors

## 2019-12-31 ENCOUNTER — ROUTINE PRENATAL (OUTPATIENT)
Dept: PERINATAL CARE | Facility: CLINIC | Age: 34
End: 2019-12-31

## 2019-12-31 VITALS
DIASTOLIC BLOOD PRESSURE: 76 MMHG | HEART RATE: 97 BPM | HEIGHT: 60 IN | WEIGHT: 165.2 LBS | BODY MASS INDEX: 32.43 KG/M2 | SYSTOLIC BLOOD PRESSURE: 118 MMHG

## 2019-12-31 DIAGNOSIS — Z3A.20 20 WEEKS GESTATION OF PREGNANCY: ICD-10-CM

## 2019-12-31 DIAGNOSIS — Z36.86 ENCOUNTER FOR ANTENATAL SCREENING FOR CERVICAL LENGTH: ICD-10-CM

## 2019-12-31 DIAGNOSIS — O09.522 MULTIGRAVIDA OF ADVANCED MATERNAL AGE IN SECOND TRIMESTER: Primary | ICD-10-CM

## 2019-12-31 DIAGNOSIS — Z36.3 ENCOUNTER FOR ANTENATAL SCREENING FOR MALFORMATIONS: ICD-10-CM

## 2019-12-31 PROCEDURE — 76817 TRANSVAGINAL US OBSTETRIC: CPT | Performed by: OBSTETRICS & GYNECOLOGY

## 2019-12-31 PROCEDURE — 76811 OB US DETAILED SNGL FETUS: CPT | Performed by: OBSTETRICS & GYNECOLOGY

## 2019-12-31 PROCEDURE — 99241 PR OFFICE CONSULTATION NEW/ESTAB PATIENT 15 MIN: CPT | Performed by: OBSTETRICS & GYNECOLOGY

## 2019-12-31 NOTE — PROGRESS NOTES
A transvaginal ultrasound was performed   Sonographer note on use of High Level Disinfection Process (Trophon) for transvaginal probe#1 used, serial #[id number]  Neena Duong RDMS

## 2019-12-31 NOTE — LETTER
January 2, 2020     Seleneyuliana Worthington, [address] [place] [address]    Patient: West Bishop   YOB: 1985   Date of Visit: 12/31/2019       Dear Dr Maxine Parker: Thank you for referring West Bishop to me for evaluation  Below are my notes for this consultation  If you have questions, please do not hesitate to call me  I look forward to following your patient along with you  Sincerely,        Vidhya Angulo MD        CC: No Recipients  Vidhya Angulo MD  1/2/2020  9:04 AM  Sign at close encounter  [address]: Ms Gerhardt Plate was seen at 20w0d for anatomic survey and cervical length screening ultrasound  See ultrasound report under "OB Procedures" tab  Please don't hesitate to contact our office with any concerns or questions    Vidhya Angulo MD

## 2020-01-02 NOTE — PROGRESS NOTES
[address]: Ms Matthieu Dawkins was seen at 20w0d for anatomic survey and cervical length screening ultrasound  See ultrasound report under "OB Procedures" tab  Please don't hesitate to contact our office with any concerns or questions    Anabell Stokes MD

## 2020-01-23 ENCOUNTER — ROUTINE PRENATAL (OUTPATIENT)
Dept: OBGYN CLINIC | Facility: CLINIC | Age: 35
End: 2020-01-23

## 2020-01-23 ENCOUNTER — PATIENT OUTREACH (OUTPATIENT)
Dept: OBGYN CLINIC | Facility: CLINIC | Age: 35
End: 2020-01-23

## 2020-01-23 VITALS
DIASTOLIC BLOOD PRESSURE: 62 MMHG | HEART RATE: 87 BPM | SYSTOLIC BLOOD PRESSURE: 110 MMHG | HEIGHT: 60 IN | BODY MASS INDEX: 33.57 KG/M2 | WEIGHT: 171 LBS

## 2020-01-23 DIAGNOSIS — O99.212 OBESITY AFFECTING PREGNANCY IN SECOND TRIMESTER: Primary | ICD-10-CM

## 2020-01-23 DIAGNOSIS — Z3A.23 23 WEEKS GESTATION OF PREGNANCY: ICD-10-CM

## 2020-01-23 PROBLEM — Z3A.10 10 WEEKS GESTATION OF PREGNANCY: Status: RESOLVED | Noted: 2019-10-28 | Resolved: 2020-01-23

## 2020-01-23 PROCEDURE — 99213 OFFICE O/P EST LOW 20 MIN: CPT | Performed by: NURSE PRACTITIONER

## 2020-01-23 NOTE — PROGRESS NOTES
Patient is primarily [address] [facility] [address] bedside to assist translation    Denies loss of fluid, vaginal bleeding and abdominal pain  Confirms frequent fetal movement  Tolerating prenatal vitamin and low-dose aspirin well  No questions or concerns at today's visit  Desires note for work stating pregnancy  [address] ultrasound reviewed-19-vertex presentation, normal appearing fetal growth, posterior placenta, no placenta previa, KAYLEEN-WNL and EFW of up oz  Recommendations reviewed including initiation of 162 mg of aspirin and follow-up ultrasound in 12 weeks  Plan:  1  Continue prenatal vitamins daily  2  Continue low-dose aspirin daily  3  Follow-up  Center scheduled  4  Work note provided  5  Twenty-eight week labs provided-patient verbalized understanding to have labs drawn after 24 gestational weeks  6  Birth plan provided-will review at next visit  7   Common discomforts of pregnancy and precautions including  labor reviewed  RTO 4 weeks
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
## 2020-01-23 NOTE — PATIENT INSTRUCTIONS
El embarazo de la semana 23 a la 26   LO QUE NECESITA SABER:   ¿Qué cambios están ocurriendo en mi cuerpo? Ahora usted está cerca o al principio del tercer trimestre  El tercer trimestre comienza a las 24 semanas y concluye al momento del [place]  Conforme lozano bebé crece más, usted podría desarrollar ciertos síntomas  Estos podrían incluir dolor en lozano espalda o en la parte inferior de los costados de lozano abdomen  Es posible que también se le formen estrías en lozano abdomen, senos, muslos o glúteos  Usted también podría presentar estreñimiento  ¿Cómo me sadie cuidar en esta etapa de mi embarazo? · Consuma alimentos saludables y variados  Alimentos saludables incluyen frutas, verduras, panes de bindu integral, alimentos lácteos bajos en grasa, frijoles, efren magras y pescado  [place] líquidos ila se le haya indicado  Pregunte cuánto líquido debe carroll cada día y cuáles líquidos son los más adecuados para usted  Limite el consumo de cafeína a menos de [address] [address]  Limite el consumo de pescado a 2 porciones cada semana  Escoja pescado con concentraciones bajas de edith ila atún al natural enlatado, camarón, salmón, bacalao o tilapia  No  coma pescado con concentraciones altas de edith ila pez nahum, caballa gigante, pargo rayado y tiburón  · Controle lozano dolor de espalda  No esté de pie por largos periodos de tiempo ni levante objetos pesados  Use aysha buena postura mientras esté de pie, se agache o se doble  Use zapatos de tacón bajo con un buen soporte  Descansar puede también ayudarla a aliviar el dolor de espalda  Pregunte a lozano médico acerca de ejercicios que usted pueda hacer para fortalecer los músculos de lozano espalda  · [address] [place]  Lozano necesidad de ciertas vitaminas y [address] [facility], ila el ácido fólico, aumenta bashir el [facility]  Las vitaminas prenatales proporcionan algunas de las vitaminas y minerales adicionales que usted necesita   [address] [place] [address] prenatales también podrían ayudar a disminuir el riesgo de ciertos defectos de nacimiento  · Consulte con lozano médico acerca de hacer ejercicio  El ejercicio moderado puede ayudarla a mantenerse en forma  Lozano médico la ayudará a planear un programa de ejercicios que sea seguro para usted bashir lozano Janece Lemme  · No fume  Si usted fuma, nunca es demasiado tarde para dejar de hacerlo  Fumar aumenta el riesgo de aborto espontáneo y otros problemas de nadeem bashir lozano Janece Lemme  Fumar puede causar que lozano bebé nazca antes de tiempo o que pese menos al nacer  Solicite información a lozano médico si usted necesita ayuda para dejar de fumar  · No consuma alcohol  El alcohol pasa de lozano cuerpo al bebé a través de la placenta  Puede afectar el desarrollo del cerebro de olzano bebé y provocar el síndrome de alcoholismo fetal (SAF)  FAS es un johanne de condiciones que causan [address], de comportamiento y de crecimiento  · Consulte con lozano médico antes de carroll cualquier medicamento  Muchos medicamentos pueden perjudicar a lozano bebé si usted los perla [facility] [address]  No tome ningún medicamento, vitaminas, hierbas o suplementos sin jayy consultar con lozano [place]  use drogas ilegales o de la lawton (ila marihuana o cocaína) mientras está embarazada  ¿Cuáles son Kya Holts consejos de seguridad bashir el embarazo? · Evite jacuzzis y saunas  No use un jacuzzi o un sauna mientras usted está embarazada, especialmente bashir el primer trimestre  Los [facility] y los saunas aumentan la temperatura de lozano bebé y el riesgo de defectos de nacimiento  · Evite la toxoplasmosis  [place] es aysha infección causada por comer carne cruda o estar cerca del excremento de un imni infectado  [place] puede causar malformaciones congénitas, aborto espontáneo y Mehran Schein  Lávese las adriana después de tocar carne cruda  Asegúrese de que la carne esté yonatan cocida antes de comerla   Evite los huevos crudos y [place] despasteurizada  Use guantes o pida que alguien la ayude a limpiar la caja de arena del mini mientras usted Patricia Brought  ¿Qué cambios están ocurriendo con mi bebé? Para las 26 semanas, lozano bebé pesará alrededor de 2 libras  Lozano bebé medirá alrededor de 10 pulgadas de franny desde el chase de la ny hasta la rabadilla (parte inferior del bebé)  Los movimientos de lozano bebé son mucho más dre en esta etapa  Los ojos de lozano bebé kayy están completamente formados y pueden abrirse parcialmente  Lozano bebé también duerme y se despierta  ¿Qué necesito saber acerca del cuidado prenatal?  Lozano médico le revisará lozano presión arterial y [place]  Es posible que también necesite lo siguiente:  · Un examen de orina  también podría realizarse para revisarle el azúcar y la proteína  Estas son señales de diabetes gestacional o de infección  La proteína en lozano orina también podría ser aysha señal de preeclampsia  La preeclampsia es aysha condición que puede desarrollarse bashir la semana 21 o después en lozano embarazo  Esta provoca presión arterial faith y [employer] con ryan riñones y [place]  · La altura uterina  es aysha medición del útero para controlar el desarrollo de lozano bebé  [employer] por lo general es igual al número de [facility] que usted tiene de embarazo  · El ritmo cardíaco de lozano bebé  será revisado  ¿Cuándo sadie buscar atención inmediata? · Usted presenta un ajay dolor de ny que no desaparece  · Usted tiene cambios en la visión nuevos o en aumento, ila visión borrosa o con manchas  · Usted tiene inflamación nueva o creciente en lozano ganesh o adriana  · Usted tiene manchado o sangrado vaginal     · Usted rompe lance o siente un chorro o gotas de agua tibia que le está bajando por lozano vagina  ¿Cuándo sadie comunicarme con mi médico?   · Usted tiene calambres, presión o tensión abdominal     · Usted tiene un cambio en la secreción vaginal     · Usted tiene un sangrado leve       · Usted tiene escalofríos o fiebre  · Usted tiene comezón, ardor o dolor vaginal      · Usted tiene aysha secreción vaginal amarillenta, verdosa, rufus o de [employer]  · Usted tiene dolor o ardor al Juleen Blocker, orina menos de lo habitual o tiene [place] rosada o sanguinolenta  · Usted tiene preguntas o inquietudes acerca de goldstein condición o cuidado  ACUERDOS SOBRE GOLDSTEIN CUIDADO:   Usted tiene el derecho de ayudar a planear goldstein cuidado  Aprenda todo lo que pueda sobre goldstein condición y ila darle tratamiento  Discuta ryan opciones de tratamiento con ryan médicos para decidir el cuidado que usted desea recibir  Usted siempre tiene el derecho de rechazar el tratamiento  Esta información es sólo para uso en educación  Goldstein intención no es darle un consejo médico sobre enfermedades o tratamientos  Colsulte con goldstein Izabella Grapes farmacéutico antes de seguir cualquier régimen médico para saber si es seguro y efectivo para usted  © 2017 [address] Information is for End User's use only and may not be sold, redistributed or otherwise used for commercial purposes  All illustrations and images included in CareNotes® are the copyrighted property of A FRANCIA A M , Inc  or Slim Iqbal

## 2020-01-23 NOTE — LETTER
January 23, 2020     Patient: Riddhi Brooks   YOB: 1985   Date of Visit: 1/23/2020       To Whom it May Concern:    Riddhi Brooks is under my professional care  She was seen in my office on 1/23/2020  She may return to work on  1/23/2020  Patient is currently pregnant with an [facility] of 5/19/2020  Patient should have access to bathrooms, hydration, be allowed a 15 minute break every 4 hours and a 30 minute meal break every 8 hours, be able to wear supportive footwear are in a well ventilated area  Patient should minimize repetitive work movements such as bending upper body  Work environment should support good body mechanics including bending from knees  Assistance with lifting anything greater than 20 lb  No ladder climbing  If you have any questions or concerns, please don't hesitate to call           Sincerely,          SAMREEN Brewster        CC: Riddhi Brooks

## 2020-02-01 ENCOUNTER — APPOINTMENT (OUTPATIENT)
Dept: LAB | Facility: HOSPITAL | Age: 35
End: 2020-02-01

## 2020-02-01 DIAGNOSIS — Z3A.23 23 WEEKS GESTATION OF PREGNANCY: ICD-10-CM

## 2020-02-01 LAB
BASOPHILS # BLD AUTO: 0.03 THOUSANDS/ΜL (ref 0–0.1)
BASOPHILS NFR BLD AUTO: 0 % (ref 0–1)
EOSINOPHIL # BLD AUTO: 0.09 THOUSAND/ΜL (ref 0–0.61)
EOSINOPHIL NFR BLD AUTO: 1 % (ref 0–6)
ERYTHROCYTE [DISTWIDTH] IN BLOOD BY AUTOMATED COUNT: 13.5 % (ref 11.6–15.1)
GLUCOSE 1H P 50 G GLC PO SERPL-MCNC: 186 MG/DL
HCT VFR BLD AUTO: 34.7 % (ref 34.8–46.1)
HGB BLD-MCNC: 11.4 G/DL (ref 11.5–15.4)
IMM GRANULOCYTES # BLD AUTO: 0.1 THOUSAND/UL (ref 0–0.2)
IMM GRANULOCYTES NFR BLD AUTO: 1 % (ref 0–2)
LYMPHOCYTES # BLD AUTO: 1.63 THOUSANDS/ΜL (ref 0.6–4.47)
LYMPHOCYTES NFR BLD AUTO: 17 % (ref 14–44)
MCH RBC QN AUTO: 30 PG (ref 26.8–34.3)
MCHC RBC AUTO-ENTMCNC: 32.9 G/DL (ref 31.4–37.4)
MCV RBC AUTO: 91 FL (ref 82–98)
MONOCYTES # BLD AUTO: 0.42 THOUSAND/ΜL (ref 0.17–1.22)
MONOCYTES NFR BLD AUTO: 4 % (ref 4–12)
NEUTROPHILS # BLD AUTO: 7.51 THOUSANDS/ΜL (ref 1.85–7.62)
NEUTS SEG NFR BLD AUTO: 77 % (ref 43–75)
NRBC BLD AUTO-RTO: 0 /100 WBCS
PLATELET # BLD AUTO: 263 THOUSANDS/UL (ref 149–390)
PMV BLD AUTO: 11.5 FL (ref 8.9–12.7)
RBC # BLD AUTO: 3.8 MILLION/UL (ref 3.81–5.12)
WBC # BLD AUTO: 9.78 THOUSAND/UL (ref 4.31–10.16)

## 2020-02-01 PROCEDURE — 82950 GLUCOSE TEST: CPT

## 2020-02-01 PROCEDURE — 85025 COMPLETE CBC W/AUTO DIFF WBC: CPT

## 2020-02-01 PROCEDURE — 86592 SYPHILIS TEST NON-TREP QUAL: CPT

## 2020-02-01 PROCEDURE — 36415 COLL VENOUS BLD VENIPUNCTURE: CPT

## 2020-02-03 DIAGNOSIS — Z3A.23 23 WEEKS GESTATION OF PREGNANCY: ICD-10-CM

## 2020-02-03 DIAGNOSIS — O99.810 ABNORMAL GLUCOSE TOLERANCE TEST (GTT) DURING PREGNANCY, ANTEPARTUM: Primary | ICD-10-CM

## 2020-02-03 LAB — RPR SER QL: NORMAL

## 2020-02-06 ENCOUNTER — TELEPHONE (OUTPATIENT)
Dept: OBGYN CLINIC | Facility: CLINIC | Age: 35
End: 2020-02-06

## 2020-02-06 ENCOUNTER — TELEPHONE (OUTPATIENT)
Dept: PERINATAL CARE | Facility: OTHER | Age: 35
End: 2020-02-06

## 2020-02-06 NOTE — TELEPHONE ENCOUNTER
Left voicemail for pt to call our office  Office number provided  Have attempted multiple times to discuss test results and explain 3 hr Glucola to pt

## 2020-02-06 NOTE — TELEPHONE ENCOUNTER
Called and left message with patient regarding her upcoming appointment on 3/24 at 2:00 PM in [place]  Patient advised appointment time is changed to 1:45 due to schedule changes

## 2020-02-06 NOTE — TELEPHONE ENCOUNTER
----- Message from Guille Lowery, RN sent at 2/4/2020 11:40 AM EST -----  Regarding: DISCUSS LAB RESULTS  VM 02/03/2020VM 02/04/2020

## 2020-02-10 NOTE — TELEPHONE ENCOUNTER
Left voicemail to please relay a message to Sabas  Please have her call our office  Office number provided

## 2020-02-10 NOTE — TELEPHONE ENCOUNTER
Spoke with pt in regards to blood work values  Explained CBC and RPR were negative  Pt failed 1 hr Glucola with the value of 186  Provider ordered 3 hr Glucola, explained this test is fasting and please complete  Pt verbalized understanding and agreed

## 2020-02-23 ENCOUNTER — APPOINTMENT (OUTPATIENT)
Dept: LAB | Facility: HOSPITAL | Age: 35
End: 2020-02-23

## 2020-02-23 DIAGNOSIS — O99.810 ABNORMAL GLUCOSE TOLERANCE TEST (GTT) DURING PREGNANCY, ANTEPARTUM: ICD-10-CM

## 2020-02-23 LAB
GLUCOSE 1H P 100 G GLC PO SERPL-MCNC: 186 MG/DL (ref 65–179)
GLUCOSE 2H P 100 G GLC PO SERPL-MCNC: 167 MG/DL (ref 65–154)
GLUCOSE 3H P 100 G GLC PO SERPL-MCNC: 162 MG/DL (ref 65–139)
GLUCOSE P FAST SERPL-MCNC: 86 MG/DL (ref 65–99)

## 2020-02-23 PROCEDURE — 36415 COLL VENOUS BLD VENIPUNCTURE: CPT

## 2020-02-23 PROCEDURE — 82952 GTT-ADDED SAMPLES: CPT

## 2020-02-23 PROCEDURE — 82951 GLUCOSE TOLERANCE TEST (GTT): CPT

## 2020-02-24 DIAGNOSIS — O99.810 ABNORMAL GLUCOSE TOLERANCE TEST (GTT) DURING PREGNANCY, ANTEPARTUM: Primary | ICD-10-CM

## 2020-02-26 PROBLEM — O99.213 OBESITY AFFECTING PREGNANCY IN THIRD TRIMESTER: Status: ACTIVE | Noted: 2019-10-28

## 2020-02-26 PROBLEM — Z3A.28 28 WEEKS GESTATION OF PREGNANCY: Status: ACTIVE | Noted: 2019-11-25

## 2020-02-26 NOTE — PATIENT INSTRUCTIONS
Conteo de patadas en el embarazo   CUIDADO AMBULATORIO:   El conteo de patadas  mide cuánto se está moviendo lozano bebé en el útero  Aysha patada de lozano bebé podría sentirse ila aysha torcedura, aysha vuelta, un crujido, un meneo o un golpe  Es común sentir a tu bebé patear a las 32 a 29 semanas de [place]  Es posible que sienta al bebé patear ya a las 20 semanas de [place]  Busque atención médica de inmediato si:   · Usted siente que lozano bebé patea menos conforme pasa el día  · Usted no siente ninguna patada en un día  Pregúntele a lozano [place]  vitaminas y minerales son adecuados para usted  · Usted siente un cambio en el número de patadas o movimientos de lozano bebé  · Usted siente menos de 10 patadas dentro de 2 horas después de contar 2 veces  · Usted tiene preguntas o inquietudes acerca de los movimientos de lozano bebé  Por qué realizar el conteo de patadas:  Los movimientos de lozano bebé podrían proporcionar información de la nadeem de lozano bebé  Es posible que si hay problemas, lozano bebé se mueva menos o nada en lo absoluto  Él podría moverse menos si no tiene suficiente espacio para desarrollarse en lozano útero (vientre), o si no está obteniendo suficiente oxígeno o nutrientes de la placenta  Informe a lozano médico tan pronto ila usted sienta un cambio en los movimientos de lozano bebé  Los problemas que se encuentran en aysha etapa más temprana son más fáciles de tratar  ¿Cuándo tengo que realizar el conteo de patadas? Cuándo realizar el conteo de patadas:   · Cuente las patadas en el mismo horario todos los GRASSE  · Realice el conteo de las patadas cuando lozano bebé esté despierto y [place]  Lozano bebé podría estar más activo en la tarde  · Realice el conteo de las patadas después de comer o carroll un refrigerio  Lozano bebé podría estar más activo después de que usted coma  Espere 2 horas después de beber líquidos que contengan cafeína   La cafeína puede hacer que lozano bebé esté más activo que lo normal     · Usted no debe fumar mientras está embarazada  El fumar aumenta el riesgo de problemas de nadeem para usted y para santiago bebé bashir el Peyton Gains  Si usted fuma, espere 2 horas para realizar el conteo de patadas  La nicotina puede hacer que santiago bebé sea más activo de lo usual   Cómo realizar el conteo de patadas:  Revise que santiago bebé esté despierto antes de realizar el conteo de patadas  Usted puede despertar a santiago bebé empujando santiago estómago suavemente, caminando o tomando algo frío  Santiago médico podría indicarle diferentes maneras de realizar el conteo  Él podría decirle que mai lo siguiente:  · Use aysha gráfica o un reloj para mantener un registro de la hora en que comienza y termina de contar  · Siéntese en aysha silla o acuéstese en santiago costado derecho  · Coloque ryan adriana en la parte más jada de santiago BJURHOLM  · Cuente hasta que llegue a las 10 patadas  Escriba cuánto tiempo le lleva contar las 10 patadas  · Podría carroll de 30 minutos a 2 horas para contar 10 patadas  No debería de carroll más de 2 horas para contar 10 patadas  · Si usted no siente las 10 patadas dentro de 2 horas, espere 1 hora y cuente de [place]  Santiago bebé puede dormir hasta por 40 minutos a la vez  Acuda a ryan consultas de control con santiago médico según le indicaron  Anote ryan preguntas para que se acuerde de hacerlas bashir ryan visitas  © 2017 [address] Information is for End User's use only and may not be sold, redistributed or otherwise used for commercial purposes  All illustrations and images included in CareNotes® are the copyrighted property of A D A M , Inc  or Slim Iqbal  Esta información es sólo para uso en educación  Santiago intención no es darle un consejo médico sobre enfermedades o tratamientos  Colsulte con santiago Juventino Chill farmacéutico antes de seguir cualquier régimen médico para saber si es seguro y efectivo para usted    El embarazo de la semana 27 a la 30   LO QUE NECESITA SABER:   Worley Hamman están ocurriendo en mi cuerpo? Usted podría notar síntomas nuevos ila falta de [place] o inflamación de ryan tobillos y pies  Es posible que también tenga dificultad para dormir o contracciones  ¿Cómo me sadie cuidar en esta etapa de mi embarazo? · Consuma alimentos saludables y variados  Alimentos saludables incluyen frutas, verduras, panes de bindu integral, alimentos lácteos bajos en grasa, frijoles, efren magras y pescado  [place] líquidos ila se le haya indicado  Pregunte cuánto líquido debe carroll cada día y cuáles líquidos son los más adecuados para usted  Limite el consumo de cafeína a menos de [address]  Limite el consumo de pescado a 2 porciones cada semana  Escoja pescado con concentraciones bajas de edith ila atún al natural enlatado, camarón, salmón, bacalao o tilapia  No  coma pescado con concentraciones altas de edith ila pez nahum, caballa gigante, pargo rayado y tiburón  · Controle la acidez  comiendo 4 o 5 comidas pequeñas cada día en vez de comidas grandes  Evite los alimentos picantes  · Controle la inflamación  al [place] y poner los pies en alto o elevados sobre [place]  · [address] [place]  Lozano necesidad de ciertas vitaminas y [address] [facility], ila el ácido fólico, aumenta bashir el [facility]  Las vitaminas prenatales proporcionan algunas de las vitaminas y minerales adicionales que usted necesita  Las vitaminas prenatales también podrían ayudar a disminuir el riesgo de ciertos defectos de nacimiento  · Consulte con lozano médico acerca de hacer ejercicio  El ejercicio moderado puede ayudarla a mantenerse en forma  Lozano médico la ayudará a planear un programa de ejercicios que sea seguro para usted bashir lozano [place]  · No fume  Si usted fuma, nunca es demasiado tarde para dejar de hacerlo  Fumar aumenta el riesgo de aborto espontáneo y otros problemas de nadeem bashir lozano [place]   Fumar puede causar que lozano bebé nazca antes de tiempo o que pese menos al nacer  Solicite información a lozano médico si usted necesita ayuda para dejar de fumar  · No consuma alcohol  El alcohol pasa de lozano cuerpo al bebé a través de la placenta  Puede afectar el desarrollo del cerebro de lozano bebé y provocar el síndrome de alcoholismo fetal (SAF)  FAS es un johanne de condiciones que causan [address], de comportamiento y de crecimiento  · Consulte con lozano médico antes de carroll cualquier medicamento  Muchos medicamentos pueden perjudicar a lozano bebé si usted los perla [facility] [address]  No tome ningún medicamento, vitaminas, hierbas o suplementos sin jayy consultar con lozano Rule Stallion  use drogas ilegales o de la lawton (ila marihuana o cocaína) mientras está embarazada  ¿Cuáles son Osmany Presser consejos de seguridad bashir el embarazo? · Evite jacuzzis y saunas  No use un jacuzzi o un sauna mientras usted está embarazada, especialmente bashir el primer trimestre  Los [facility] y los saunas aumentan la temperatura de lozano bebé y el riesgo de defectos de nacimiento  · Evite la toxoplasmosis  [place] es aysha infección causada por comer carne cruda o estar cerca del excremento de un mini infectado  [place] puede causar malformaciones congénitas, aborto espontáneo y Mehran Schein  Lávese las adriana después de tocar carne cruda  Asegúrese de que la carne esté yonatan cocida antes de comerla  Evite los huevos crudos y la Shearon Bard  Use guantes o pida que alguien la ayude a limpiar la caja de arena del mini mientras usted Madeborae Leslie  ¿Qué cambios están ocurriendo con mi bebé? Para las 30 semanas, lozano bebé podría pesar más de 3 libras  Lozano bebé podría medir alrededor de 11 pulgadas de franny desde la punta de la ny hasta la rabadilla (parte inferior del bebé)  Ahora lozano bebé puede abrir y cerrar ryan ojos  Las patadas y movimientos de lozano bebé son más dre en erika momento    ¿Qué necesito saber acerca del cuidado prenatal?  Lozano médico le revisará lozano presión arterial y [place]  Es posible que también necesite lo siguiente:  · Los análisis de rebel:  podrían realizarse para revisar signos de anemia o el tipo de [place]  · Un examen de orina  también podría realizarse para revisarle el azúcar y la proteína  Estas son señales de diabetes gestacional o de infección  La proteína en lozano orina también podría ser aysha señal de preeclampsia  La preeclampsia es aysha condición que puede desarrollarse bashir la semana 21 o después en lozano embarazo  Esta provoca presión arterial faith y [employer] con ryan riñones y [place]  · Aysha vacuna contra difteria, tétanos y tos ferina y vacuna contra la gripe  podría ser recomendado por lozano médico      · La detección de diabetes gestacional  se realizará usando aysha prueba oral de tolerancia a la glucosa  Aysha prueba oral de tolerancia a la glucosa comienza con aysha revisión de los niveles de azúcar en la rebel después de que usted no haya comido por 8 horas  Después se le da aysha bebida de glucosa  Le revisan el nivel de azúcar en la rebel después de 1 hora, 2 horas y algunas veces 3 horas  Los médicos analizarán si el nivel de azúcar en la rebel aumenta después del primer análisis  · La altura uterina  es aysha medición del útero para controlar el desarrollo de lozano bebé  [employer] por lo general es igual al número de [address] [place] que usted tiene de embarazo  Lozano médico también podría revisar la posición de lozano bebé  · El ritmo cardíaco de lozano bebé  será revisado  ¿Cuándo sadie buscar atención inmediata? · Usted presenta un ajay dolor de ny que no desaparece  · Usted tiene cambios en la visión nuevos o en aumento, ila visión borrosa o con manchas  · Usted tiene inflamación nueva o creciente en lozano ganesh o adriana  · Usted tiene manchado o sangrado vaginal     · Usted rompe lance o siente un chorro o gotas de agua tibia que le está bajando por lozano vagina    ¿Cuándo sadie comunicarme con mi médico?   · Usted tiene más de 5 contracciones en 1 hora  · Usted nota algún cambio en los movimientos de lozano bebé  · Usted tiene calambres, presión o tensión abdominal     · Usted tiene un cambio en la secreción vaginal     · Usted tiene escalofríos o fiebre  · Usted tiene comezón, ardor o dolor vaginal      · Usted tiene aysha secreción vaginal amarillenta, verdosa, rufus o de [employer]  · Usted tiene dolor o ardor al Juleen Blocker, orina menos de lo habitual o tiene [place] rosada o sanguinolenta  · Usted tiene preguntas o inquietudes acerca de lozano condición o cuidado  ACUERDOS SOBRE LOZANO CUIDADO:   Usted tiene el derecho de ayudar a planear lozano cuidado  Aprenda todo lo que pueda sobre lozano condición y ila darle tratamiento  Discuta ryan opciones de tratamiento con ryan médicos para decidir el cuidado que usted desea recibir  Usted siempre tiene el derecho de rechazar el tratamiento  Esta información es sólo para uso en educación  Lozano intención no es darle un consejo médico sobre enfermedades o tratamientos  Colsulte con lozano Izabella Grapes farmacéutico antes de seguir cualquier régimen médico para saber si es seguro y efectivo para usted  © 2017 [address]  Information is for End User's use only and may not be sold, redistributed or otherwise used for commercial purposes  All illustrations and images included in CareNotes® are the copyrighted property of A D A M , Inc  or Slim Iqbal  Ejercicios para el síndrome del túnel carpiano   LO QUE NECESITA SABER:   ¿Qué necesito saber sobre los ejercicios para síndrome del túnel carpiano? Los ejercicios para el síndrome del túnel carpiano podrían ayudar a aliviar el dolor y aumentar lozano arco de movilidad  Lozano médico o terapeuta físico le puede indicar con que frecuencia necesita hacer los ejercicios  ¿Qué ejercicios puedo hacer? · Extensión de los dedos:  Junte el pulgar y el joi de ryan dedos  Manténgalos rectos  Coloque aysha goma elástica alrededor [employer] dedos y pulgar  Separe los dedos  Luego júntelos lentamente sin permitir que la goma elástica se caiga  Repita 40 veces  · Estiramiento del flexor de la cash:  Mantenga el Chana Stallion recto  Agarre ryan dedos con la otra mano  Lentamente doble los dedos hacia atrás (con la cano hacia fuera) hasta que sienta un estiramiento en la [place]  Sostenga está posición por 10 segundos  Repita 5 veces  · Estiramiento del extensor de la cash:  Mantenga el Chana Stallion recto  Agarre ryan dedos con la otra mano  Lentamente doble los dedos y la mano hacia abajo (con la cano hacia usted) hasta que siente el estiramiento encima de la [place]  Sostenga está posición por 10 segundos  Repita 5 veces  · Rotación de la cash sin usar pesas:  Siéntese en aysha silla con goldstein antebrazo apoyado sobre goldstein muslo o sobre aysha wolf  Con la cano de goldstein mano hacia abajo, flexione la [place] 3 pulgadas hacia arriba y luego bájela lentamente  [place] el antebrazo y repita con la cano [place] arriba  Elzbieta cada ejercicio 20 veces  · Encogimiento de hombros:  Párese con los brazos a los lados  Levante ryan hombros hacia ryan oídos y sosténgalos bashir 1 osito  Luego mueva ryan hombros hacia atrás, ila si fuera a juntar los omóplatos  Mantenga esta posición bashir 1 osito  Luego relaje los hombros  Repita 20 veces  ACUERDOS SOBRE GOLDSTEIN CUIDADO:   Usted tiene el derecho de ayudar a planear goldstein cuidado  Aprenda todo lo que pueda sobre goldstein condición y ila darle tratamiento  Discuta ryan opciones de tratamiento con ryan médicos para decidir el cuidado que usted desea recibir  Usted siempre tiene el derecho de rechazar el tratamiento  Esta información es sólo para uso en educación  Goldstein intención no es darle un consejo médico sobre enfermedades o tratamientos   Colsulte con goldstein Nils Pierce farmacéutico antes de seguir cualquier régimen médico para saber si es seguro y [place] para usted   © 2017 26[address] [facility] Information is for End User's use only and may not be sold, redistributed or otherwise used for commercial purposes  All illustrations and images included in CareNotes® are the copyrighted property of A D A M , Inc  or Slim Iqbal

## 2020-02-27 ENCOUNTER — ROUTINE PRENATAL (OUTPATIENT)
Dept: OBGYN CLINIC | Facility: CLINIC | Age: 35
End: 2020-02-27

## 2020-02-27 VITALS
HEART RATE: 99 BPM | DIASTOLIC BLOOD PRESSURE: 64 MMHG | HEIGHT: 61 IN | WEIGHT: 172 LBS | BODY MASS INDEX: 32.47 KG/M2 | SYSTOLIC BLOOD PRESSURE: 121 MMHG

## 2020-02-27 DIAGNOSIS — O26.899 CARPAL TUNNEL SYNDROME DURING PREGNANCY: ICD-10-CM

## 2020-02-27 DIAGNOSIS — G56.00 CARPAL TUNNEL SYNDROME DURING PREGNANCY: ICD-10-CM

## 2020-02-27 DIAGNOSIS — O99.213 OBESITY AFFECTING PREGNANCY IN THIRD TRIMESTER: ICD-10-CM

## 2020-02-27 DIAGNOSIS — O99.810 ABNORMAL GLUCOSE TOLERANCE TEST (GTT) DURING PREGNANCY, ANTEPARTUM: Primary | ICD-10-CM

## 2020-02-27 DIAGNOSIS — Z3A.28 28 WEEKS GESTATION OF PREGNANCY: ICD-10-CM

## 2020-02-27 PROCEDURE — 99213 OFFICE O/P EST LOW 20 MIN: CPT | Performed by: NURSE PRACTITIONER

## 2020-02-27 PROCEDURE — 90715 TDAP VACCINE 7 YRS/> IM: CPT

## 2020-02-27 PROCEDURE — 90471 IMMUNIZATION ADMIN: CPT

## 2020-02-27 NOTE — PROGRESS NOTES
Patient is primarily Greek-speaking marlene Fleming at bedside to assist translation  Denies loss of fluid, vaginal bleeding and abdominal pain  Confirms frequent fetal movement  Tolerating prenatal vitamin and low-dose aspirin well  Abnormal 3 hour GTT does not have appointment for diabetes in pregnancy program   Reviewed implications of poorly treated diabetes in pregnancy to both her and fetus  Patient verbalizes understanding  CBC and RPR reviewed  Reviewed recommendations for Tdap vaccine, patient is agreeable to administration at today's  Visit  BP:  121/64  Weight: +4 lb  Urine:  Did not provide specimen at today's visit  Plan:  1  Continue prenatal vitamins and low-dose aspirin daily  2  Fetal kick counts reviewed, encouraged daily and written information provided  3  Carpal tunnel exercises provided  If no relief from exercises will order wrist splints  4  Tdap vaccine today  5  Abnormal 3 hour GTT-referral provided for diabetes in pregnancy program   Patient encouraged to call for appointment  6   Common discomforts of pregnancy and precautions including  labor reviewed  RTO 2 weeks

## 2020-03-04 ENCOUNTER — TELEPHONE (OUTPATIENT)
Dept: PERINATAL CARE | Facility: CLINIC | Age: 35
End: 2020-03-04

## 2020-03-04 NOTE — TELEPHONE ENCOUNTER
Cal Day writes on Wed Mar 4, 2020 2132 from [address]:    [place] again asking for the hours she is available or her email  She called yesterday at 346pm  I am not available  Portuguese speaker      Pablomckenzie Day writes on Mon Mar 2, [place] 0759 from [address]:    pt called @ 4pm  Difficult to get in contact  I left another message requesting email if possible or to call between 8 and 12      Cal Day writes on  1203 from [address]:    lm      Cal Day writes on  1132 from [address]:    called @ 1130 as per pt request  No answer left message on vm to call me bk  I will attempt again tomorrow if she is does not call today  Cal Day writes on  1020 from [facility] [[id number]]:     [place] to setup appt

## 2020-03-11 ENCOUNTER — PATIENT OUTREACH (OUTPATIENT)
Dept: OBGYN CLINIC | Facility: CLINIC | Age: 35
End: 2020-03-11

## 2020-03-11 ENCOUNTER — ROUTINE PRENATAL (OUTPATIENT)
Dept: OBGYN CLINIC | Facility: CLINIC | Age: 35
End: 2020-03-11

## 2020-03-11 VITALS
DIASTOLIC BLOOD PRESSURE: 57 MMHG | WEIGHT: 173.6 LBS | SYSTOLIC BLOOD PRESSURE: 118 MMHG | HEART RATE: 92 BPM | BODY MASS INDEX: 34.08 KG/M2 | HEIGHT: 60 IN

## 2020-03-11 DIAGNOSIS — O23.43 URINARY TRACT INFECTION IN MOTHER DURING THIRD TRIMESTER OF PREGNANCY: ICD-10-CM

## 2020-03-11 DIAGNOSIS — Z3A.30 30 WEEKS GESTATION OF PREGNANCY: ICD-10-CM

## 2020-03-11 DIAGNOSIS — O99.213 OBESITY AFFECTING PREGNANCY IN THIRD TRIMESTER: ICD-10-CM

## 2020-03-11 DIAGNOSIS — O99.810 ABNORMAL GLUCOSE TOLERANCE TEST (GTT) DURING PREGNANCY, ANTEPARTUM: Primary | ICD-10-CM

## 2020-03-11 PROCEDURE — 87186 SC STD MICRODIL/AGAR DIL: CPT | Performed by: NURSE PRACTITIONER

## 2020-03-11 PROCEDURE — 99213 OFFICE O/P EST LOW 20 MIN: CPT | Performed by: NURSE PRACTITIONER

## 2020-03-11 PROCEDURE — 87077 CULTURE AEROBIC IDENTIFY: CPT | Performed by: NURSE PRACTITIONER

## 2020-03-11 PROCEDURE — 87086 URINE CULTURE/COLONY COUNT: CPT | Performed by: NURSE PRACTITIONER

## 2020-03-11 RX ORDER — NITROFURANTOIN 25; 75 MG/1; MG/1
100 CAPSULE ORAL 2 TIMES DAILY
Qty: 14 CAPSULE | Refills: 0 | Status: SHIPPED | OUTPATIENT
Start: 2020-03-11 | End: 2020-03-18

## 2020-03-11 NOTE — PATIENT INSTRUCTIONS
La diabetes y el embarazo   LO QUE NECESITA SABER:   ¿Qué necesito saber acerca de la diabetes y Freddy Portal? Planifique lozano embarazo para que ryan médicos puedan ayudarle a tener un embarazo y un bebé saludable  Controle ryan niveles de azúcar en la rebel antes y bashir el embarazo para disminuir lozano riesgo de presentar problemas de [place]  Lozano médico podría recomendar que lozano nivel de A1c sea menos de 6 5% antes de quedar embarazada  Bashir el [facility], es posible que ryan niveles de A1c necesiten estar entre 6 y 7%  ¿Cómo puedo controlar mi diabetes bashir el embarazo? · Revise el nivel de azúcar en la rebel  Podría ser necesario que revise lozano nivel de azúcar en la rebel por lo menos 3 veces al día  Pregunte a lozano médico cuándo y qué tan seguido usted necesita revisar lozano azúcar en la rebel  Él le indicará dónde deben de estar ryan niveles de azúcar en la rebel bashir diferentes horas del día  Él podría indicarle que ryan niveles de azúcar en la rebel estén entre 60 mg/dL a 99 mg/dL antes de [employer], a la hora de acostarse y bashir la noche  Después de las comidas él prefiere que estén entre 100 mg/dL a 129 mg/dL  Lozano médico le mostrará ila utilizar un medidor de glucosa o glucómetro para revisar los niveles  · Pregunte [employer]  Es peligroso carroll ciertos [employer] está tratando de quedar [place] o si Mardell Overall  Si usted padece de diabetes tipo 2 y perla píldoras diabéticas, es posible que necesite dejar de tomarlas y comenzar a usar la insulina  El uso de Jose Rafael es seguro bashir Freddy Portal  · Revise lozano presión arterial con frecuencia  La hipertensión puede provocar problemas de nadeem al igual que con lozano [place]  Las lecturas de la presión arterial por lo general se escriben con 2 números  Si no tiene la presión arterial faith antes del embarazo, la presión arterial sistólica (el primer número) debe estar entre 110 y 129   La presión arterial diastólica (el osito número) debe estar entre 72 y 78  Si tiene presión arterial faith antes del embarazo, la presión arterial sistólica debe estarentre 120 y 80, y la presión arterial diastólica, entre [de-identified] y 661  · Mantenga un peso saludable  El aumento de peso recomendado en las mujeres que tiene sobrepeso es entre 15 a 25 libras  El aumento de peso recomendado para las mujeres que están obesas entre 10 a 20 libras  Lozano riesgo de presentar problemas ila hipertensión y [place] prematuro es mayor si usted tiene sobrepeso  · No consuma alcohol  El alcohol es peligroso para el feto  El alcohol también puede aumentar lozano nivel de azúcar en la rebel y hacer más dificil controlar lozano diabetes  Solicite información a lozano médico si necesita ayuda para dejar de carroll alcohol  · Ayude a evitar la hipoglucemia  Lozano riesgo de presentar hipoglucemia es más alto bashir el embarazo porque es posible que no perciba los síntomas  [place] riesgo es más alto bashir el primer trimestre  Coma comidas regularmente y meriendas para evitar la hipoglucemia  Mantenga siempre consigo tabletas de glucosa en jessica de que baje lozano nivel de azúcar en la rebel  Si usted no tiene tabletas de glucosa, puede carroll [place], [place] o soda regular  Infórmele a lozano nuvia de los síntomas de la hipoglucemia para que puedan ayudarle si no puede ayudarse a sí misma  Consulte a lozano médico acerca de cómo sobrellevar lozano hipoglucemia  · Ayude a evitar la cetoacidosis diabética (CAD)  Esta es aysha condición seria que puede ocurrir cuando lozano nivel de azúcar en la rebel sube demasiado  El embarazo aumenta lozano riesgo de presentar CAD  Los síntomas de CAD incluyen dolor de BJCRISELDAHOLM, Matheus, vómitos y confusión  Lozano médico podría sugerirle que revise los niveles de cetonas en lozano orina cuando lozano nivel de azúcar en la rebel es alto  También puede solicitarle que revise ryan cetonas regularmente si usted está enferma    ¿Qué necesito saber acerca de la nutrición y Anna Shelling? La cantidad de calorías que usted necesita, depende de santiago peso antes de [place]  Santiago médico o dietista le indicará cuántas calorías necesita consumir cada día  Es probable que necesite consumir más calorías mientras está [place]  Si usted tiene sobrepeso, es probable que necesite menos calorías  ¿Qué cantidad de actividad física necesito? El ejercicio puede ayudarle a mantener estable santiago nivel de azúcar en la rebel  El ejercicio puede ayudarla a bajar de peso si usted tiene sobrepeso  Pregunte a santiago médico cuánto ejercicio necesita hacer cada día  Pregunte qué tipos de [place] física puede realizar sin peligro mientras [place]st  ¿Qué puedo hacer para tener un bebé saludable? · Cumpla con todas las citas con ryan médicos  Ellos le ayudarán a controlar santiago diabetes bashir el embarazo  Es posible que necesite acudir con santiago médico cada 1 a 2 semanas bashir el primer y osito trimestre de santiago embarazo  Al final de santiago embarazo, es posible que usted necesite acudir con santiago proveedor nadeem cada semana  Bashir estos exámenes, santiago médico podría revisarle ryan ojos, ryan niveles de A1c y el bienestar de usted y santiago bebé  · [employer] suplementos de ácido Josephine Sreekanth a carroll ácido fólico antes de santiago embarazo y continúe hasta que usted tenga al menos 12 semanas de [place]  El ácido fólico disminuye el riesgo de santiago bebé de tener defectos de nacimiento  Pregunte a santiago médico cuánto ácido fólico debe carroll  · No fume  La nicotina es perjudicial para santiago bebé y dificulta el control de santiago diabetes  No use cigarrillos electrónicos o tabaco sin humo en vez de cigarrillos o para tratar de dejar de fumar  Todos estos aún contienen nicotina  Pida a santiago médico información si usted fuma actualmente y [place] para dejar de hacerlo  ¿Cómo será controlada mi diabetes bashir y después del parto? Los médicos revisarán ryan niveles de azúcar en la rebel bashir el [place]   [place] insulina o glucosa bashir el parto para mantener el azúcar en lozano rebel en el nivel adecuado  Elzbieta lo siguiente después del parto:  · Acuda a todas las citas de control  Los médicos continuarán ayudándole a controlar lozano diabetes después del [place]  Consulte con lozano médico acerca de las opciones de anticonceptivos  Es importante prepararse para el siguiente embarazo si usted piensa tener otro hijo  · [place] ryan medicamentos ila se le haya indicado  Si usted tiene diabetes tipo 2, es posible que pueda empezar a carroll lozano medicamento para la diabetes de nuevo  Si usted tiene diabetes tipo 1, la cantidad de insulina que necesita disminuirá después de [place] a lozano bebé  Existen ciertos medicamentos que no podrá carroll si está dando de lactar  Pregunte a lozano médico cuándo y con qué frecuencia usted necesita revisar lozano nivel de azúcar en la rebel  Es posible que necesite controlarlos al menos 3 veces al día  · Ayude a evitar la hipoglucemia si está dando de lactar  El riesgo de hipoglucemia es [address] [place]  Lozano dietista le ayudará a crear un plan alimenticio que funcione mejor para usted  [place] aysha merienda antes de alimentar a lozano bebé  Coma ryan comidas y meriendas regularmente para mantener estable lozano nivel de azúcar en la rebel  ¿Cuáles son los riesgos de la diabetes bashir el embarazo? Usted corre un mayor riesgo de presión arterial faith, problemas oculares, parto prematuro (antes de [place]) y aborto espontáneo  Un aborto espontáneo es la pérdida de un feto antes de las 20 semanas de [place]  Los [employer] de azúcar en la rebel pueden aumentar lozano riesgo de tener un bebé jada, un bebé con anomalías congénitas y un parto en el que nace muerto el bebé (mortinato)  Mortinato es la pérdida del feto (bebé no farnaz) después de 20 semanas de embarazo  ¿Cuándo sadie buscar atención inmediata?    · Lozano nivel de azúcar en la rebel es mayor de 200 mg/dL y usted tiene dolor de BJURHOLM, Matheus, vómitos y confusión  ¿Cuándo sadie comunicarme con mi médico?   · Usted está mareado o temblando  · Usted está sudando o tiene dolor de [place]  · Usted presenta cambios en lozano visión  · Usted tiene preguntas o inquietudes acerca de lozano condición o cuidado  ACUERDOS SOBRE LOZANO CUIDADO:   Usted tiene el derecho de ayudar a planear lozano cuidado  Aprenda todo lo que pueda sobre lozano condición y ila darle tratamiento  Discuta ryan opciones de tratamiento con ryan médicos para decidir el cuidado que usted desea recibir  Usted siempre tiene el derecho de rechazar el tratamiento  Esta información es sólo para uso en educación  Lozano intención no es darle un consejo médico sobre enfermedades o tratamientos  Colsulte con lozano Marisela Chiquis farmacéutico antes de seguir cualquier régimen médico para saber si es seguro y efectivo para usted  © 2017 [address]  Information is for End User's use only and may not be sold, redistributed or otherwise used for commercial purposes  All illustrations and images included in CareNotes® are the copyrighted property of [employer]  or Slim Iqbal  Conteo de patadas en el embarazo   CUIDADO AMBULATORIO:   El conteo de patadas  mide cuánto se está moviendo lozano bebé en el útero  Blanquita patada de lozano bebé podría sentirse ila blanquita torcedura, blanquita vuelta, un crujido, un meneo o un golpe  Es común sentir a tu bebé patear a las 32 a 29 semanas de Derenda Pae  Es posible que sienta al bebé patear ya a las 20 semanas de Derenda Pae  Busque atención médica de inmediato si:   · Usted siente que lozano bebé patea menos conforme pasa el día  · Usted no siente ninguna patada en un día  Pregúntele a lozano Anay Alpha vitaminas y minerales son adecuados para usted  · Usted siente un cambio en el número de patadas o movimientos de lozano bebé  · Usted siente menos de 10 patadas dentro de 2 horas después de contar 2 veces       · Usted tiene preguntas o inquietudes acerca de los movimientos de lozano bebé  Por qué realizar el conteo de patadas:  Los movimientos de lozano bebé podrían proporcionar información de la nadeem de lozano bebé  Es posible que si hay problemas, lozano bebé se mueva menos o nada en lo absoluto  Él podría moverse menos si no tiene suficiente espacio para desarrollarse en lozano útero (vientre), o si no está obteniendo suficiente oxígeno o nutrientes de la placenta  Informe a lozano médico tan pronto ila usted sienta un cambio en los movimientos de lozano bebé  Los problemas que se encuentran en aysha etapa más temprana son más fáciles de tratar  ¿Cuándo tengo que realizar el conteo de patadas? Cuándo realizar el conteo de patadas:   · Cuente las patadas en el mismo horario todos los GRASSE  · Realice el conteo de las patadas cuando lozano bebé esté despierto y [place]  Lozano bebé podría estar más activo en la tarde  · Realice el conteo de las patadas después de comer o carroll un refrigerio  Lozano bebé podría estar más activo después de que usted coma  Espere 2 horas después de beber líquidos que contengan cafeína  La cafeína puede hacer que lozano bebé esté más activo que lo normal     · Usted no debe fumar mientras está embarazada  El fumar aumenta el riesgo de problemas de nadeem para usted y para lozano bebé bashir el [facility]  Si usted fuma, espere 2 horas para realizar el conteo de patadas  La nicotina puede hacer que lozano bebé sea más activo de lo usual   Cómo realizar el conteo de patadas:  Revise que lozano bebé esté despierto antes de realizar el conteo de patadas  Usted puede despertar a lozano bebé empujando lozano estómago suavemente, caminando o tomando algo frío  Lozano médico podría indicarle diferentes maneras de realizar el conteo  Él podría decirle que mai lo siguiente:  · Use aysha gráfica o un reloj para mantener un registro de la hora en que comienza y termina de contar  · Siéntese en aysha silla o acuéstese en lozano costado derecho  · Coloque ryan adriana en la parte más jada de lozano BJURHOLM       · Cuente hasta que llegue a las 10 patadas  Escriba cuánto tiempo le lleva contar las 10 patadas  · Podría carroll de 30 minutos a 2 horas para contar 10 patadas  No debería de carroll más de 2 horas para contar 10 patadas  · Si usted no siente las 10 patadas dentro de 2 horas, espere 1 hora y cuente de [place]  Lozano bebé puede dormir hasta por 40 minutos a la vez  Acuda a ryan consultas de control con lozano médico según le indicaron  Anote ryan preguntas para que se acuerde de hacerlas bashir ryan visitas  © 2017 [address] Jimenez Information is for End User's use only and may not be sold, redistributed or otherwise used for commercial purposes  All illustrations and images included in CareNotes® are the copyrighted property of A D A M , Inc  or Slim Iqbal  Esta información es sólo para uso en educación  Lozano intención no es darle un consejo médico sobre enfermedades o tratamientos  Colsulte con lozano Janis Placedo farmacéutico antes de seguir cualquier régimen médico para saber si es seguro y efectivo para usted  El embarazo de la semana 31 a la 34   LO QUE NECESITA SABER:   ¿Qué cambios están ocurriendo en mi cuerpo? Es posible que usted continúe teniendo síntomas tales ila falta de [place], [place], contracciones o inflamación en ryan tobillos y pies  Usted podría estar aumentando 1 ruslan por semana ahora  ¿Cómo me sadie cuidar en esta etapa de mi embarazo? · Consuma alimentos saludables y variados  Alimentos saludables incluyen frutas, verduras, panes de bindu integral, alimentos lácteos bajos en grasa, frijoles, efren magras y pescado  [place] líquidos ila se le haya indicado  Pregunte cuánto líquido debe carroll cada día y cuáles líquidos son los más adecuados para usted  Limite el consumo de cafeína a menos de [address]  Limite el consumo de pescado a 2 porciones cada semana   Escoja pescado con concentraciones bajas de edith ila atún al natural enlatado, camarón, salmón, bacalao o tilapia  No  coma pescado con concentraciones altas de edith ila pez nahum, caballa gigante, pargo rayado y tiburón  · Controle la acidez  comiendo 4 o 5 comidas pequeñas cada día en vez de comidas grandes  Evite los alimentos picantes  · Controle la inflamación  al [place] y poner los pies en alto o elevados sobre [place]  · [address]  Lozano necesidad de ciertas vitaminas y [address] [facility], ila el ácido fólico, aumenta bashir el [facility]  Las vitaminas prenatales proporcionan algunas de las vitaminas y minerales adicionales que usted necesita  Las vitaminas prenatales también podrían ayudar a disminuir el riesgo de ciertos defectos de nacimiento  · Consulte con lozano médico acerca de hacer ejercicio  El ejercicio moderado puede ayudarla a mantenerse en forma  Lozano médico la ayudará a planear un programa de ejercicios que sea seguro para usted bashir lozano [facility]  · No fume  Si usted fuma, nunca es demasiado tarde para dejar de hacerlo  Fumar aumenta el riesgo de aborto espontáneo y otros problemas de nadeem bashir lozano [facility]  Fumar puede causar que lozano bebé nazca antes de tiempo o que pese menos al nacer  Solicite información a lozano médico si usted necesita ayuda para dejar de fumar  · No consuma alcohol  El alcohol pasa de lozano cuerpo al bebé a través de la placenta  Puede afectar el desarrollo del cerebro de lozano bebé y provocar el síndrome de alcoholismo fetal (SAF)  FAS es un johanne de condiciones que causan [address], de comportamiento y de crecimiento  · Consulte con lozano médico antes de carroll cualquier medicamento  Muchos medicamentos pueden perjudicar a lozano bebé si usted los perla [address]  No tome ningún medicamento, vitaminas, hierbas o suplementos sin jayy consultar con lozano Malathi Oden  use drogas ilegales o de la lawton (ila marihuana o cocaína) mientras está embarazada    ¿Cuáles son algunos consejos de seguridad bashir [place]a Haw? · Evite jacuzzis y saunas  No use un jacuzzi o un sauna mientras usted está embarazada, especialmente bashir el primer trimestre  Los [place] [facility] y los saunas aumentan la temperatura de santiago bebé y el riesgo de defectos de nacimiento  · Evite la toxoplasmosis  [place] es aysha infección causada por comer carne cruda o estar cerca del excremento de un mini infectado  Mondamin puede causar malformaciones congénitas, aborto espontáneo y Mehran Schein  Lávese las adriana después de tocar carne cruda  Asegúrese de que la carne esté yonatan cocida antes de comerla  Evite los huevos crudos y la [place]  Use guantes o pida que alguien la ayude a limpiar la caja de arena del mini mientras usted [place]  ¿Qué cambios están ocurriendo con mi bebé? Para las [address], santiago bebé podría pesar más de 5 libras  Santiago bebé medirá alrededor de 12 ½ pulgadas de franny desde el chase de la ny hasta la rabadilla (parte inferior del bebé)  Santiago bebé está aumentando alrededor de ½ ruslan por semana  Ahora santiago bebé puede abrir y cerrar ryan ojos  Las patadas y movimientos de santiago bebé son más dre en erika momento  ¿Qué necesito saber acerca del cuidado prenatal?  Santiago médico le revisará santiago presión arterial y [place]  Es posible que también necesite lo siguiente:  · Un examen de orina  también podría realizarse para revisarle el azúcar y la proteína  Estas son señales de diabetes gestacional o de infección  La proteína en santiago orina también podría ser aysha señal de preeclampsia  La preeclampsia es aysha condición que puede desarrollarse bashir la semana 21 o después en santiago embarazo  Esta provoca presión arterial faith y [employer] con ryan riñones y [place]  · La vacuna Tdap  podría ser recomendado por santiago médico      · La altura uterina  es aysha medición del útero para controlar el desarrollo de santiago bebé  [employer] por lo general es igual al número de [facility] que usted tiene de embarazo   Santiago médico también podría revisar la posición de lozano bebé  · El ritmo cardíaco de lozano bebé  será revisado  ¿Cuándo sadie buscar atención inmediata? · Usted presenta un ajay dolor de [place] que no desaparece  · Usted tiene cambios en la visión nuevos o en aumento, ila visión borrosa o con manchas  · Usted tiene inflamación nueva o creciente en lozano ganesh o adriana  · Usted tiene manchado o sangrado vaginal     · Usted rompe lance o siente un chorro o gotas de agua tibia que le está bajando por lozano vagina  ¿Cuándo sadie comunicarme con mi médico?   · Usted tiene más de 5 contracciones en 1 hora  · Usted nota algún cambio en los movimientos de lozano bebé  · Usted tiene calambres, presión o tensión abdominal     · Usted tiene un cambio en la secreción vaginal     · Usted tiene escalofríos o fiebre  · Usted tiene comezón, ardor o dolor vaginal      · Usted tiene aysha secreción vaginal amarillenta, verdosa, rufus o de [employer]  · Usted tiene dolor o ardor al Mercedes Parcel, orina menos de lo habitual o tiene [place] rosada o sanguinolenta  · Usted tiene preguntas o inquietudes acerca de lozano condición o cuidado  ACUERDOS SOBRE LOZANO CUIDADO:   Usted tiene el derecho de ayudar a planear lozano cuidado  Aprenda todo lo que pueda sobre lozano condición y ila darle tratamiento  Discuta ryan opciones de tratamiento con ryan médicos para decidir el cuidado que usted desea recibir  Usted siempre tiene el derecho de rechazar el tratamiento  Esta información es sólo para uso en educación  Lozano intención no es darle un consejo médico sobre enfermedades o tratamientos  Colsulte con lozano [place] farmacéutico antes de seguir cualquier régimen médico para saber si es seguro y efectivo para usted  © 2017 [address] Jimenez Information is for End User's use only and may not be sold, redistributed or otherwise used for commercial purposes   All illustrations and images included in CareNotes® are the copyrighted property of Lashell CHANG  or Slim Iqbal

## 2020-03-11 NOTE — PROGRESS NOTES
Patient is primarily Czech-speaking Sierra Tolliver at bedside to assist with translation  Denies loss of fluid, vaginal bleeding and abdominal pain  Confirms frequent fetal movement  Doing fetal kick counts daily  Tolerating prenatal vitamin and low-dose aspirin well  Abnormal 3 hour GTT-appointment with diabetes in pregnancy program 3/20/2020  Encouraged patient to keep appointment as scheduled  Follow-up  Center for ultrasound is scheduled 3/24/2020  On urine dip patient is positive nitrates  Will send for culture and treat prophylactically  Patient denies pain and urgency  States she has had frequency the entire pregnancy  Denies fever chills or pain  Carpal tunnel is getting better with exercises  BP:  118/57  Weight: +5 lb  Urine:  Positive nitrate; negative protein; negative glucose  Plan:  1  Continue prenatal vitamins and low-dose aspirin daily  2  Continue fetal kick counts daily  3  GDM-appointment diabetes in pregnancy 3/20/2020  4  Positive nitrates on urine      - Rx Macrobid 100 mg twice a day for 7 days     - urine culture sent  Encouraged patient to increase hydration call with worsening symptoms, fever or chills  5  Carpal tunnel-continue exercises  6  Follow-up  Center scheduled for 3/24/2020  7   Common discomforts of pregnancy and precautions including   labor reviewed  RTO 2 weeks f/u US/ consult diabetes in pregnancy/urine culture

## 2020-03-11 NOTE — PROGRESS NOTES
VANDA met with Pt and provider to assist with interpretation  Pt denies any concerns  Has diabetic appointment on 3/20  Pt was advice to  antibiotic for UTI  Will call VANDA [facility] with VB, LOF, Ctx's and decreased fetal movement  Pt verbalized understanding

## 2020-03-13 LAB — BACTERIA UR CULT: ABNORMAL

## 2020-03-23 ENCOUNTER — TELEPHONE (OUTPATIENT)
Dept: PERINATAL CARE | Facility: CLINIC | Age: 35
End: 2020-03-23

## 2020-03-23 NOTE — TELEPHONE ENCOUNTER
[facility] Telephone Note:    Spoke with pt and confirmed appointment with [facility]  Pt advised of new visitor policy allowing NO support persons for appointment  PT also screened for COVID19      Cough: no  Fever: no  Shortness of breath:no  Known exposures to COVID-19, or international travel: no

## 2020-03-24 ENCOUNTER — ROUTINE PRENATAL (OUTPATIENT)
Dept: OBGYN CLINIC | Facility: CLINIC | Age: 35
End: 2020-03-24

## 2020-03-24 ENCOUNTER — ULTRASOUND (OUTPATIENT)
Dept: PERINATAL CARE | Facility: CLINIC | Age: 35
End: 2020-03-24

## 2020-03-24 ENCOUNTER — CONSULT (OUTPATIENT)
Dept: PERINATAL CARE | Facility: CLINIC | Age: 35
End: 2020-03-24

## 2020-03-24 VITALS
HEIGHT: 60 IN | WEIGHT: 173 LBS | DIASTOLIC BLOOD PRESSURE: 58 MMHG | BODY MASS INDEX: 33.96 KG/M2 | SYSTOLIC BLOOD PRESSURE: 115 MMHG | HEART RATE: 89 BPM

## 2020-03-24 VITALS
DIASTOLIC BLOOD PRESSURE: 58 MMHG | HEIGHT: 60 IN | BODY MASS INDEX: 33.9 KG/M2 | SYSTOLIC BLOOD PRESSURE: 115 MMHG | HEART RATE: 89 BPM

## 2020-03-24 VITALS
SYSTOLIC BLOOD PRESSURE: 107 MMHG | HEART RATE: 88 BPM | HEIGHT: 60 IN | BODY MASS INDEX: 33.77 KG/M2 | WEIGHT: 172 LBS | DIASTOLIC BLOOD PRESSURE: 70 MMHG

## 2020-03-24 DIAGNOSIS — Z3A.32 32 WEEKS GESTATION OF PREGNANCY: ICD-10-CM

## 2020-03-24 DIAGNOSIS — O09.523 MULTIGRAVIDA OF ADVANCED MATERNAL AGE IN THIRD TRIMESTER: ICD-10-CM

## 2020-03-24 DIAGNOSIS — Z3A.32 32 WEEKS GESTATION OF PREGNANCY: Primary | ICD-10-CM

## 2020-03-24 DIAGNOSIS — O99.213 OBESITY AFFECTING PREGNANCY IN THIRD TRIMESTER: ICD-10-CM

## 2020-03-24 DIAGNOSIS — O24.410 DIET CONTROLLED GESTATIONAL DIABETES MELLITUS (GDM) IN THIRD TRIMESTER: Primary | ICD-10-CM

## 2020-03-24 DIAGNOSIS — O99.810 ABNORMAL GLUCOSE TOLERANCE TEST (GTT) DURING PREGNANCY, ANTEPARTUM: ICD-10-CM

## 2020-03-24 DIAGNOSIS — O24.419 GESTATIONAL DIABETES MELLITUS (GDM) IN THIRD TRIMESTER, GESTATIONAL DIABETES METHOD OF CONTROL UNSPECIFIED: Primary | ICD-10-CM

## 2020-03-24 PROBLEM — O09.529 AMA (ADVANCED MATERNAL AGE) MULTIGRAVIDA 35+: Status: ACTIVE | Noted: 2020-03-24

## 2020-03-24 PROCEDURE — 99213 OFFICE O/P EST LOW 20 MIN: CPT | Performed by: OBSTETRICS & GYNECOLOGY

## 2020-03-24 PROCEDURE — 76816 OB US FOLLOW-UP PER FETUS: CPT | Performed by: OBSTETRICS & GYNECOLOGY

## 2020-03-24 PROCEDURE — 99215 OFFICE O/P EST HI 40 MIN: CPT | Performed by: NURSE PRACTITIONER

## 2020-03-24 NOTE — LETTER
March 24, 2020     [place], [address] [facility] [address] [facility]    Patient: Korey Roldan   YOB: 1985   Date of Visit: 3/24/2020       Dear Dr Lizz Gil: Thank you for referring Korey Roldan to me for evaluation  Below are my notes for this consultation  If you have questions, please do not hesitate to call me  I look forward to following your patient along with you  Sincerely,        Prince Rodriguez MD        CC: No Recipients  Prince Rodriguez MD  3/24/2020  7:49 PM  Sign at close encounter  Patient speaks [place] only  She was set up for her 1st diabetes education class  on 3/20/20 which she failed to show up for  Today's ultrasound shows normal interval growth and fluid  Patient was made aware of her ultrasound findings with the use of [employer] as my   She will see [facility] for diabetes education and nutrition and will be started on a glucometer  Recommend repeating her ultrasound for growth in 4 weeks  If 20% or more FBS are >95 or 2 hr pp are >120 she will be started on insulin or a oral hypoglycemic such as metformin or glyburide  If medications are required to control her diabetes she will require twice weekly fetal testing with NST's from 32 weeks on  I would also recommend delivery around her due date  If she does not require any medications to control her diabetes then she can start fetal testing at 40 weeks and be delivered by 41 weeks       Prince Rodriguez MD

## 2020-03-24 NOTE — PATIENT INSTRUCTIONS
Coronavirus (SWVRE-21) pregnancy FAQs: Medical experts answer your questions  From [employer] cy  [id number]  bc (courtesy of [facility])  As of 3/18/20  By Lakeisha Hdz 39  Medically reviewed by Society for Maternal-Fetal Medicine       We asked parents-to-be to send us their most pressing questions about coronavirus (COVID-19)  Among them: Is it still safe to deliver in a hospital? What if my ob-gyn has the virus? We sent those questions to the top docs at the Society for Maternal-Fetal Medicine  Here are their answers  The coronavirus (COVID-19) pandemic has been declared a national emergency in the [facility] by [employer]  Moms-to-be like you are concerned about everything from getting medicines to managing disruptions at work  But above and beyond any worry about lifestyle changes is a focus on your health and the impact of COVID-19 on your pregnancy  We asked obstetrics doctors who handle the most complicated pregnancy issues to answer your questions about the coronavirus  Here are their responses, provided by Dr Kaleigh Cisneros and her colleagues at the [employer]  Am I at more risk for COVID-19 if I'm pregnant? We don't know  Pregnancy does change your immune system in ways that might make you more susceptible to viral respiratory infections like COVID-19  If you become infected, you might also be at higher risk for more severe illness compared to the general population  Although this does not appear to be the case with COVID-19, based on limited data so far, a higher risk has been true for pregnant women with other coronavirus infections (SARS-CoV and MERS-CoV) and other viral respiratory infections, such as flu  It's important to take preventive actions to avoid infection, such as washing your hands often and avoiding people who are sick      How might coronavirus affect my pregnancy? Again, we don't know  Women with other coronavirus infections (such as SARS-CoV) did not have miscarriage or stillbirth at higher rates than the general population  We know that having other respiratory viral infections during pregnancy, such as flu, has been associated with problems like low birth weight and  birth  Also, having a high fever early in pregnancy may increase the risk of certain birth defects  Could I transmit coronavirus to my baby during pregnancy or delivery? We don't know whether you could transmit COVID-19 to your baby before or during delivery  However, among the few case studies of infants born to mothers with [address] published in peer-reviewed literature, none of the infants tested positive for the virus  Also, there was no virus detected in samples of amniotic fluid or breast milk  There have been a few reports of newborns as young as a few days old with infection, suggesting that a mother can transmit the infection to her infant through close contact after delivery  There have been no reports of mother-to-baby transmission for other coronaviruses (MERS-CoV and SARS-CoV), although only limited information is available  Is it safe for me to deliver at a hospital where there have been COVID-19 cases? Yes  We know that COVID-19 is a very scary virus  The good news is that hospitals are taking great precautions to keep patients and healthcare providers safe  When a patient is even suspected to have COVID-19, they are placed in a negative pressure room  (Think of these rooms as vacuums that suck and filter the air so it's safe for the other people in the hospital)  This makes it possible for you to deliver at the hospital without putting you or your baby at risk  Hospitals are also implementing stricter visiting policies to keep patients safe  It's worth calling your hospital to check if there are any new regulations to be aware of      What plans should I make now in case the hospital system is overwhelmed when it's time for me to deliver? This is a great question to talk with your doctor or midwife about when you're 34 to 36 weeks pregnant  Every hospital is making different plans for dealing with this scenario  I work in healthcare  Should I ask my doctor to excuse me from work until the baby is born? What if I work in a school, the travel [employer], or some other high-risk setting? Healthcare facilities should take care to limit the exposure of pregnant employees to patients with confirmed or suspected COVID-19, just as they would with other infectious cases  If you continue working, be sure to follow the CDC's risk assessment and infection control guidelines  If you work in a school, travel [employer], or other high-risk setting, talk with your employer about what it's doing to protect employees and minimize infection risks  Wash your hands often  What if my OB gets COVID-19? If your doctor or midwife tests positive for COVID-19, they will need to be quarantined until they recover and are no longer at risk of transmitting the virus  In this case, you'll be assigned to another OB in your doctor's practice (or you may choose another practitioner yourself)  Ask your new OB or your doctor's office if you should self-quarantine or be tested for the virus  (It will depend on when you last saw your provider and when that person tested positive )    Should we hold off on trying to conceive because of COVID-19? At this time, there's no reason to hold off on trying to get pregnant, but the data we have is really limited  For example, we don't think the virus causes birth defects or increases your risk of miscarriage  But we don't know whether you could transmit COVID-19 to your baby before or during delivery  We also don't know if the virus lives in semen or can be sexually transmitted      We have a babymoon scheduled in the next few months - should we cancel? If you're planning to travel internationally or on a cruise, you should strongly consider canceling  At this time, the virus has reached more than 140 countries, and there are travel bans to [place], most of [place], and [place]  Places where large numbers of people gather are at highest risk, especially airports and cruise ships  If you're planning travel in the [place] , note that any travel setting increases your risk of exposure, and there may be travel bans even in [place] by the time you're scheduled to go  Even if you're state is not blocked, you'll definitely want to avoid traveling to communities where the virus is spreading  To find out where these places are, check The [employer] Times map based on CDC data  For the most current advice to help you avoid exposure, check the CDC's COVID-19 travel page  Will the hospital separate me from my  and keep the baby in quarantine? If you test positive for COVID-19 or have been exposed but have no symptoms, the CDC, [employer] of Obstetricians and Gynecologists, and the Society for [place] [employer] all recommend that you be  from your baby to decrease the risk of transmission to the baby  This would last until you are no longer at risk of transmitting the virus  If you do not have COVID-19 and have not been exposed to the virus, the hospital will not separate you from your   My hospital is restricting visitors and only allowing one support person  If my support person leaves after the delivery, will they be allowed to come back? Every hospital has different policies  Contact your hospital or labor and delivery unit a week or so before delivery to get the most up-to-date restrictions  In general, if your support person needs to leave, they would be allowed back unless they knew they were exposed to COVID-19 after leaving your company    [facility] understands that the coronavirus (COVID-19) pandemic is an evolving story and that your questions will change over time  We'll continue asking moms and dads in our Community what they want to know, and we'll get the answers from experts to keep them - and you - informed and supported  My mom was planning to fly here to help me care for my new baby after delivery  Should I tell her not to come? Yes  If your mom is over 61 or has any serious chronic medical conditions (such as heart disease, lung disease, or diabetes), she is at higher risk of serious illness from COVID-19 and should avoid air travel  And remember that any travel setting increases a person's risk of exposure  So, it may be risky to have her around the baby after she has been traveling  For the most current advice on traveling, check the CDC's COVID-19 travel page  [facility] understands that the coronavirus pandemic is an evolving story and that your questions will change over time  We'll continue asking moms and dads in our Community what they want to know, and we'll get the answers from experts to keep them - and you - informed and supported

## 2020-03-24 NOTE — PROGRESS NOTES
OB/GYN  PN Visit  [place]  [id number]  3/24/2020  3:20 PM  Dr Felix Sanchez MD    S: 37 y o  [id number] 32w0d here for PN visit  OB complaints:  Ctxns: no  Leakage: no  Bleeding: no  Fetal movement: yes    She reports that she is feeling well  No new complaints  Patient has been diagnosed with gestational diabetes since last appointment       O:  Vitals:    03/24/20 1501   BP: 107/70   Pulse: 88       Gen: no acute distress, nonlabored breathing  Fundal height: 33cm  FHT: 140bpm       Presentation: Vertex    A/P:    Pregnancy at 32 weeks  - Delivery consent signed today  - RTC in 2 weeks for routine appointment    Gestational diabetes  - Patient to start recording blood sugar checks starting this evening  - She will send results to diabetes educator and meet with them next week    Contraception  - Patient unsure of what contraception she wants to use after delivery  - She is interested in IUD, will plan to discuss with SW regarding [facility] program    D/w Dr Kenneth Delgado MD  3/24/2020  3:20 PM

## 2020-03-25 PROBLEM — O24.419 GESTATIONAL DIABETES MELLITUS (GDM) IN THIRD TRIMESTER: Status: ACTIVE | Noted: 2020-03-25

## 2020-03-25 RX ORDER — BLOOD SUGAR DIAGNOSTIC
STRIP MISCELLANEOUS
Qty: 250 EACH | Refills: 1 | Status: SHIPPED | OUTPATIENT
Start: 2020-03-25 | End: 2020-05-16 | Stop reason: HOSPADM

## 2020-04-06 ENCOUNTER — TELEMEDICINE (OUTPATIENT)
Dept: OBGYN CLINIC | Facility: CLINIC | Age: 35
End: 2020-04-06

## 2020-04-06 DIAGNOSIS — O09.523 MULTIGRAVIDA OF ADVANCED MATERNAL AGE IN THIRD TRIMESTER: ICD-10-CM

## 2020-04-06 DIAGNOSIS — O24.419 GESTATIONAL DIABETES MELLITUS (GDM) IN THIRD TRIMESTER, GESTATIONAL DIABETES METHOD OF CONTROL UNSPECIFIED: ICD-10-CM

## 2020-04-06 DIAGNOSIS — Z3A.33 33 WEEKS GESTATION OF PREGNANCY: Primary | ICD-10-CM

## 2020-04-06 PROCEDURE — G2012 BRIEF CHECK IN BY MD/QHP: HCPCS | Performed by: OBSTETRICS & GYNECOLOGY

## 2020-04-09 ENCOUNTER — PATIENT OUTREACH (OUTPATIENT)
Dept: OBGYN CLINIC | Facility: CLINIC | Age: 35
End: 2020-04-09

## 2020-04-20 ENCOUNTER — ROUTINE PRENATAL (OUTPATIENT)
Dept: OBGYN CLINIC | Facility: CLINIC | Age: 35
End: 2020-04-20

## 2020-04-20 VITALS
WEIGHT: 174 LBS | TEMPERATURE: 98.3 F | HEART RATE: 86 BPM | SYSTOLIC BLOOD PRESSURE: 106 MMHG | DIASTOLIC BLOOD PRESSURE: 73 MMHG | HEIGHT: 60 IN | BODY MASS INDEX: 34.16 KG/M2

## 2020-04-20 DIAGNOSIS — Z3A.36 36 WEEKS GESTATION OF PREGNANCY: Primary | ICD-10-CM

## 2020-04-20 LAB
SL AMB  POCT GLUCOSE, UA: NEGATIVE
SL AMB POCT URINE PROTEIN: NEGATIVE

## 2020-04-20 PROCEDURE — 99213 OFFICE O/P EST LOW 20 MIN: CPT | Performed by: OBSTETRICS & GYNECOLOGY

## 2020-04-20 PROCEDURE — 87653 STREP B DNA AMP PROBE: CPT | Performed by: OBSTETRICS & GYNECOLOGY

## 2020-04-20 PROCEDURE — 81002 URINALYSIS NONAUTO W/O SCOPE: CPT | Performed by: OBSTETRICS & GYNECOLOGY

## 2020-04-22 LAB — GP B STREP DNA SPEC QL NAA+PROBE: NORMAL

## 2020-04-23 ENCOUNTER — TELEPHONE (OUTPATIENT)
Dept: PERINATAL CARE | Facility: CLINIC | Age: 35
End: 2020-04-23

## 2020-04-24 ENCOUNTER — ULTRASOUND (OUTPATIENT)
Dept: PERINATAL CARE | Facility: CLINIC | Age: 35
End: 2020-04-24

## 2020-04-24 VITALS
DIASTOLIC BLOOD PRESSURE: 62 MMHG | TEMPERATURE: 98 F | HEIGHT: 60 IN | SYSTOLIC BLOOD PRESSURE: 116 MMHG | WEIGHT: 174.4 LBS | BODY MASS INDEX: 34.24 KG/M2 | HEART RATE: 95 BPM

## 2020-04-24 DIAGNOSIS — O09.523 MULTIGRAVIDA OF ADVANCED MATERNAL AGE IN THIRD TRIMESTER: ICD-10-CM

## 2020-04-24 DIAGNOSIS — O24.410 DIET CONTROLLED GESTATIONAL DIABETES MELLITUS (GDM) IN THIRD TRIMESTER: ICD-10-CM

## 2020-04-24 DIAGNOSIS — Z3A.36 36 WEEKS GESTATION OF PREGNANCY: Primary | ICD-10-CM

## 2020-04-24 PROCEDURE — 76816 OB US FOLLOW-UP PER FETUS: CPT | Performed by: OBSTETRICS & GYNECOLOGY

## 2020-04-27 ENCOUNTER — TELEMEDICINE (OUTPATIENT)
Dept: OBGYN CLINIC | Facility: CLINIC | Age: 35
End: 2020-04-27

## 2020-04-27 DIAGNOSIS — Z3A.36 36 WEEKS GESTATION OF PREGNANCY: Primary | ICD-10-CM

## 2020-04-27 DIAGNOSIS — O99.213 OBESITY AFFECTING PREGNANCY IN THIRD TRIMESTER: ICD-10-CM

## 2020-04-27 DIAGNOSIS — Z30.09 ENCOUNTER FOR CONTRACEPTIVE PLANNING: ICD-10-CM

## 2020-04-27 DIAGNOSIS — O09.523 MULTIGRAVIDA OF ADVANCED MATERNAL AGE IN THIRD TRIMESTER: ICD-10-CM

## 2020-04-27 DIAGNOSIS — O24.410 DIET CONTROLLED GESTATIONAL DIABETES MELLITUS (GDM) IN THIRD TRIMESTER: ICD-10-CM

## 2020-04-27 PROCEDURE — G2012 BRIEF CHECK IN BY MD/QHP: HCPCS | Performed by: OBSTETRICS & GYNECOLOGY

## 2020-05-04 ENCOUNTER — TELEMEDICINE (OUTPATIENT)
Dept: OBGYN CLINIC | Facility: CLINIC | Age: 35
End: 2020-05-04

## 2020-05-04 DIAGNOSIS — Z34.93 PRENATAL CARE, THIRD TRIMESTER: ICD-10-CM

## 2020-05-04 DIAGNOSIS — O24.410 DIET CONTROLLED GESTATIONAL DIABETES MELLITUS (GDM) IN THIRD TRIMESTER: ICD-10-CM

## 2020-05-04 DIAGNOSIS — Z3A.37 37 WEEKS GESTATION OF PREGNANCY: Primary | ICD-10-CM

## 2020-05-04 PROBLEM — Z3A.36 36 WEEKS GESTATION OF PREGNANCY: Status: RESOLVED | Noted: 2019-10-28 | Resolved: 2020-05-04

## 2020-05-04 PROCEDURE — G2012 BRIEF CHECK IN BY MD/QHP: HCPCS | Performed by: OBSTETRICS & GYNECOLOGY

## 2020-05-13 ENCOUNTER — ROUTINE PRENATAL (OUTPATIENT)
Dept: OBGYN CLINIC | Facility: CLINIC | Age: 35
End: 2020-05-13

## 2020-05-13 VITALS
WEIGHT: 176 LBS | HEIGHT: 60 IN | HEART RATE: 89 BPM | SYSTOLIC BLOOD PRESSURE: 112 MMHG | DIASTOLIC BLOOD PRESSURE: 78 MMHG | BODY MASS INDEX: 34.55 KG/M2

## 2020-05-13 DIAGNOSIS — O24.410 DIET CONTROLLED GESTATIONAL DIABETES MELLITUS (GDM) IN THIRD TRIMESTER: Primary | ICD-10-CM

## 2020-05-13 DIAGNOSIS — O99.213 OBESITY AFFECTING PREGNANCY IN THIRD TRIMESTER: ICD-10-CM

## 2020-05-13 DIAGNOSIS — Z3A.39 39 WEEKS GESTATION OF PREGNANCY: ICD-10-CM

## 2020-05-13 LAB
SL AMB  POCT GLUCOSE, UA: NEGATIVE
SL AMB POCT BLOOD,UA: ABNORMAL
SL AMB POCT URINE PROTEIN: ABNORMAL

## 2020-05-13 PROCEDURE — 99213 OFFICE O/P EST LOW 20 MIN: CPT | Performed by: NURSE PRACTITIONER

## 2020-05-13 PROCEDURE — 81002 URINALYSIS NONAUTO W/O SCOPE: CPT | Performed by: NURSE PRACTITIONER

## 2020-05-15 ENCOUNTER — TELEPHONE (OUTPATIENT)
Dept: OTHER | Facility: OTHER | Age: 35
End: 2020-05-15

## 2020-05-15 ENCOUNTER — ANESTHESIA (INPATIENT)
Dept: ANESTHESIOLOGY | Facility: HOSPITAL | Age: 35
DRG: 560 | End: 2020-05-15
Payer: COMMERCIAL

## 2020-05-15 ENCOUNTER — ANESTHESIA EVENT (INPATIENT)
Dept: ANESTHESIOLOGY | Facility: HOSPITAL | Age: 35
DRG: 560 | End: 2020-05-15
Payer: COMMERCIAL

## 2020-05-15 ENCOUNTER — HOSPITAL ENCOUNTER (OUTPATIENT)
Dept: LABOR AND DELIVERY | Facility: HOSPITAL | Age: 35
Discharge: HOME | DRG: 560 | End: 2020-05-15
Payer: COMMERCIAL

## 2020-05-15 ENCOUNTER — HOSPITAL ENCOUNTER (INPATIENT)
Facility: HOSPITAL | Age: 35
LOS: 1 days | Discharge: HOME | DRG: 560 | End: 2020-05-16
Attending: OBSTETRICS & GYNECOLOGY | Admitting: OBSTETRICS & GYNECOLOGY
Payer: COMMERCIAL

## 2020-05-15 DIAGNOSIS — Z3A.39 39 WEEKS GESTATION OF PREGNANCY: Primary | ICD-10-CM

## 2020-05-15 LAB
ABO GROUP BLD: NORMAL
BASE EXCESS BLDCOA CALC-SCNC: -8.4 MMOL/L (ref 3–11)
BASE EXCESS BLDCOV CALC-SCNC: -8.6 MMOL/L (ref 1–9)
BLD GP AB SCN SERPL QL: NEGATIVE
ERYTHROCYTE [DISTWIDTH] IN BLOOD BY AUTOMATED COUNT: 14.4 % (ref 11.6–15.1)
GLUCOSE SERPL-MCNC: 112 MG/DL (ref 65–140)
HCO3 BLDCOA-SCNC: 20.1 MMOL/L (ref 17.3–27.3)
HCO3 BLDCOV-SCNC: 18 MMOL/L (ref 12.2–28.6)
HCT VFR BLD AUTO: 35.1 % (ref 34.8–46.1)
HGB BLD-MCNC: 12 G/DL (ref 11.5–15.4)
MCH RBC QN AUTO: 31.3 PG (ref 26.8–34.3)
MCHC RBC AUTO-ENTMCNC: 34.2 G/DL (ref 31.4–37.4)
MCV RBC AUTO: 91 FL (ref 82–98)
O2 CT VFR BLDCOA CALC: 9.9 ML/DL
OXYHGB MFR BLDCOA: 43.2 %
OXYHGB MFR BLDCOV: 53.9 %
PCO2 BLDCOA: 52.3 MM[HG] (ref 30–60)
PCO2 BLDCOV: 40.8 MM HG (ref 27–43)
PH BLDCOA: 7.2 [PH] (ref 7.23–7.43)
PH BLDCOV: 7.26 [PH] (ref 7.19–7.49)
PLATELET # BLD AUTO: 216 THOUSANDS/UL (ref 149–390)
PMV BLD AUTO: 11.4 FL (ref 8.9–12.7)
PO2 BLDCOA: 22.8 MM HG (ref 5–25)
PO2 BLDCOV: 24.4 MM HG (ref 15–45)
RBC # BLD AUTO: 3.84 MILLION/UL (ref 3.81–5.12)
RH BLD: POSITIVE
RPR SER QL: NORMAL
SAO2 % BLDCOV: 12 ML/DL
SPECIMEN EXPIRATION DATE: NORMAL
WBC # BLD AUTO: 11.12 THOUSAND/UL (ref 4.31–10.16)

## 2020-05-15 PROCEDURE — 82805 BLOOD GASES W/O2 SATURATION: CPT | Performed by: OBSTETRICS & GYNECOLOGY

## 2020-05-15 PROCEDURE — 4A1HXCZ MONITORING OF PRODUCTS OF CONCEPTION, CARDIAC RATE, EXTERNAL APPROACH: ICD-10-PCS | Performed by: OBSTETRICS & GYNECOLOGY

## 2020-05-15 PROCEDURE — 82948 REAGENT STRIP/BLOOD GLUCOSE: CPT

## 2020-05-15 PROCEDURE — 85027 COMPLETE CBC AUTOMATED: CPT | Performed by: OBSTETRICS & GYNECOLOGY

## 2020-05-15 PROCEDURE — NC001 PR NO CHARGE: Performed by: OBSTETRICS & GYNECOLOGY

## 2020-05-15 PROCEDURE — 86850 RBC ANTIBODY SCREEN: CPT | Performed by: OBSTETRICS & GYNECOLOGY

## 2020-05-15 PROCEDURE — 86592 SYPHILIS TEST NON-TREP QUAL: CPT | Performed by: OBSTETRICS & GYNECOLOGY

## 2020-05-15 PROCEDURE — 0KQM0ZZ REPAIR PERINEUM MUSCLE, OPEN APPROACH: ICD-10-PCS | Performed by: OBSTETRICS & GYNECOLOGY

## 2020-05-15 PROCEDURE — 99214 OFFICE O/P EST MOD 30 MIN: CPT

## 2020-05-15 PROCEDURE — 86901 BLOOD TYPING SEROLOGIC RH(D): CPT | Performed by: OBSTETRICS & GYNECOLOGY

## 2020-05-15 PROCEDURE — 86900 BLOOD TYPING SEROLOGIC ABO: CPT | Performed by: OBSTETRICS & GYNECOLOGY

## 2020-05-15 PROCEDURE — 59410 OBSTETRICAL CARE: CPT | Performed by: OBSTETRICS & GYNECOLOGY

## 2020-05-15 RX ORDER — IBUPROFEN 600 MG/1
600 TABLET ORAL EVERY 6 HOURS PRN
Status: DISCONTINUED | OUTPATIENT
Start: 2020-05-15 | End: 2020-05-16 | Stop reason: HOSPADM

## 2020-05-15 RX ORDER — ONDANSETRON 2 MG/ML
4 INJECTION INTRAMUSCULAR; INTRAVENOUS EVERY 6 HOURS PRN
Status: DISCONTINUED | OUTPATIENT
Start: 2020-05-15 | End: 2020-05-15

## 2020-05-15 RX ORDER — LIDOCAINE HYDROCHLORIDE AND EPINEPHRINE 15; 5 MG/ML; UG/ML
INJECTION, SOLUTION EPIDURAL AS NEEDED
Status: DISCONTINUED | OUTPATIENT
Start: 2020-05-15 | End: 2020-05-15 | Stop reason: SURG

## 2020-05-15 RX ORDER — DOCUSATE SODIUM 100 MG/1
100 CAPSULE, LIQUID FILLED ORAL 2 TIMES DAILY
Status: DISCONTINUED | OUTPATIENT
Start: 2020-05-15 | End: 2020-05-16 | Stop reason: HOSPADM

## 2020-05-15 RX ORDER — CALCIUM CARBONATE 200(500)MG
1000 TABLET,CHEWABLE ORAL DAILY PRN
Status: DISCONTINUED | OUTPATIENT
Start: 2020-05-15 | End: 2020-05-16 | Stop reason: HOSPADM

## 2020-05-15 RX ORDER — DIAPER,BRIEF,INFANT-TODD,DISP
1 EACH MISCELLANEOUS 4 TIMES DAILY PRN
Status: DISCONTINUED | OUTPATIENT
Start: 2020-05-15 | End: 2020-05-16 | Stop reason: HOSPADM

## 2020-05-15 RX ORDER — SODIUM CHLORIDE 9 MG/ML
125 INJECTION, SOLUTION INTRAVENOUS CONTINUOUS
Status: DISCONTINUED | OUTPATIENT
Start: 2020-05-15 | End: 2020-05-15

## 2020-05-15 RX ORDER — IBUPROFEN 600 MG/1
600 TABLET ORAL EVERY 6 HOURS PRN
Status: DISCONTINUED | OUTPATIENT
Start: 2020-05-15 | End: 2020-05-15

## 2020-05-15 RX ORDER — OXYTOCIN/RINGER'S LACTATE 30/500 ML
1-30 PLASTIC BAG, INJECTION (ML) INTRAVENOUS
Status: DISCONTINUED | OUTPATIENT
Start: 2020-05-15 | End: 2020-05-15

## 2020-05-15 RX ORDER — ACETAMINOPHEN 325 MG/1
650 TABLET ORAL EVERY 4 HOURS PRN
Status: DISCONTINUED | OUTPATIENT
Start: 2020-05-15 | End: 2020-05-16 | Stop reason: HOSPADM

## 2020-05-15 RX ORDER — SIMETHICONE 80 MG
80 TABLET,CHEWABLE ORAL 4 TIMES DAILY PRN
Status: DISCONTINUED | OUTPATIENT
Start: 2020-05-15 | End: 2020-05-16 | Stop reason: HOSPADM

## 2020-05-15 RX ORDER — METHYLERGONOVINE MALEATE 0.2 MG/ML
INJECTION INTRAVENOUS
Status: COMPLETED
Start: 2020-05-15 | End: 2020-05-15

## 2020-05-15 RX ORDER — SODIUM CHLORIDE, SODIUM LACTATE, POTASSIUM CHLORIDE, CALCIUM CHLORIDE 600; 310; 30; 20 MG/100ML; MG/100ML; MG/100ML; MG/100ML
125 INJECTION, SOLUTION INTRAVENOUS CONTINUOUS
Status: DISCONTINUED | OUTPATIENT
Start: 2020-05-15 | End: 2020-05-15

## 2020-05-15 RX ADMIN — SODIUM CHLORIDE 125 ML/HR: 0.9 INJECTION, SOLUTION INTRAVENOUS at 03:56

## 2020-05-15 RX ADMIN — HYDROCORTISONE 1 APPLICATION: 1 CREAM TOPICAL at 14:23

## 2020-05-15 RX ADMIN — SODIUM CHLORIDE 999 ML/HR: 0.9 INJECTION, SOLUTION INTRAVENOUS at 02:50

## 2020-05-15 RX ADMIN — METHYLERGONOVINE MALEATE 0.2 MG: 0.2 INJECTION, SOLUTION INTRAMUSCULAR; INTRAVENOUS at 08:18

## 2020-05-15 RX ADMIN — Medication 1 APPLICATION: at 14:23

## 2020-05-15 RX ADMIN — LIDOCAINE HYDROCHLORIDE AND EPINEPHRINE 2 ML: 15; 5 INJECTION, SOLUTION EPIDURAL at 05:08

## 2020-05-15 RX ADMIN — WITCH HAZEL 1 PAD: 500 SOLUTION RECTAL; TOPICAL at 14:23

## 2020-05-15 RX ADMIN — IBUPROFEN 600 MG: 600 TABLET ORAL at 09:12

## 2020-05-15 RX ADMIN — Medication 2 MILLI-UNITS/MIN: at 03:25

## 2020-05-15 RX ADMIN — ROPIVACAINE HYDROCHLORIDE: 2 INJECTION, SOLUTION EPIDURAL; INFILTRATION at 05:15

## 2020-05-15 RX ADMIN — ACETAMINOPHEN 650 MG: 325 TABLET, FILM COATED ORAL at 14:20

## 2020-05-15 RX ADMIN — LIDOCAINE HYDROCHLORIDE AND EPINEPHRINE 3 ML: 15; 5 INJECTION, SOLUTION EPIDURAL at 05:06

## 2020-05-16 VITALS
HEIGHT: 60 IN | DIASTOLIC BLOOD PRESSURE: 68 MMHG | HEART RATE: 100 BPM | BODY MASS INDEX: 34.55 KG/M2 | OXYGEN SATURATION: 96 % | RESPIRATION RATE: 20 BRPM | WEIGHT: 176 LBS | SYSTOLIC BLOOD PRESSURE: 109 MMHG | TEMPERATURE: 98.7 F

## 2020-05-16 PROCEDURE — NC001 PR NO CHARGE: Performed by: OBSTETRICS & GYNECOLOGY

## 2020-05-16 RX ORDER — DIAPER,BRIEF,INFANT-TODD,DISP
1 EACH MISCELLANEOUS 4 TIMES DAILY PRN
Qty: 30 G | Refills: 0
Start: 2020-05-16 | End: 2020-07-09

## 2020-05-16 RX ORDER — IBUPROFEN 600 MG/1
600 TABLET ORAL EVERY 6 HOURS PRN
Qty: 30 TABLET | Refills: 0
Start: 2020-05-16 | End: 2020-07-09

## 2020-05-16 RX ORDER — ACETAMINOPHEN 325 MG/1
650 TABLET ORAL EVERY 4 HOURS PRN
Qty: 30 TABLET | Refills: 0
Start: 2020-05-16 | End: 2020-07-09

## 2020-05-20 LAB — GLUCOSE SERPL-MCNC: 85 MG/DL (ref 65–140)

## 2020-05-22 LAB — PLACENTA IN STORAGE: NORMAL

## 2020-06-05 ENCOUNTER — PATIENT OUTREACH (OUTPATIENT)
Dept: OBGYN CLINIC | Facility: CLINIC | Age: 35
End: 2020-06-05

## 2020-07-09 ENCOUNTER — OFFICE VISIT (OUTPATIENT)
Dept: OBGYN CLINIC | Facility: CLINIC | Age: 35
End: 2020-07-09

## 2020-07-09 ENCOUNTER — TELEPHONE (OUTPATIENT)
Dept: OBGYN CLINIC | Facility: CLINIC | Age: 35
End: 2020-07-09

## 2020-07-09 PROBLEM — O23.43 URINARY TRACT INFECTION IN MOTHER DURING THIRD TRIMESTER OF PREGNANCY: Status: RESOLVED | Noted: 2020-03-11 | Resolved: 2020-07-09

## 2020-07-09 PROBLEM — O99.810 ABNORMAL GLUCOSE TOLERANCE TEST (GTT) DURING PREGNANCY, ANTEPARTUM: Status: RESOLVED | Noted: 2020-02-03 | Resolved: 2020-07-09

## 2020-07-09 PROBLEM — O26.899 CARPAL TUNNEL SYNDROME DURING PREGNANCY: Status: RESOLVED | Noted: 2020-02-27 | Resolved: 2020-07-09

## 2020-07-09 PROBLEM — G56.00 CARPAL TUNNEL SYNDROME DURING PREGNANCY: Status: RESOLVED | Noted: 2020-02-27 | Resolved: 2020-07-09

## 2020-07-09 PROBLEM — O09.529 AMA (ADVANCED MATERNAL AGE) MULTIGRAVIDA 35+: Status: RESOLVED | Noted: 2020-03-24 | Resolved: 2020-07-09

## 2020-07-09 PROBLEM — Z3A.39 39 WEEKS GESTATION OF PREGNANCY: Status: RESOLVED | Noted: 2019-10-28 | Resolved: 2020-07-09

## 2020-07-09 PROBLEM — N87.9 CERVICAL DYSPLASIA: Status: ACTIVE | Noted: 2020-07-09

## 2020-07-09 PROCEDURE — 99213 OFFICE O/P EST LOW 20 MIN: CPT | Performed by: OBSTETRICS & GYNECOLOGY

## 2020-07-09 NOTE — PROGRESS NOTES
OB/GYN VISIT  Judson Ayala  2020  6:06 PM    Subjective:     Judson Ayala is a 38 y o  [id number] female s/p  on 5/15/20 who is here for postpartum visit  Her pregnancy was notable for A1GDM, obesity, long interval pregnancy, and advanced maternal age  She delivered a viable male  on 5/15/20 at 21 , weight 7lb 11 5oz, APGAR 9/9  She was discharged on PPD #1  She is doing well today with no complaints  She is breastfeeding and bottlefeeding  Baby doing well, attending pediatrician appointments  She is able to rest and tolerates a regular diet  Has daily bowel movement, denies constipation or diarrhea  She is not currently sexually active  She reports good support system at home  Objective:    Vitals: Last menstrual period 2019, currently breastfeeding  There is no height or weight on file to calculate BMI  Physical Exam   Constitutional: She is oriented to person, place, and time  She appears well-developed and well-nourished  No distress  Pulmonary/Chest: Effort normal    Abdominal: Soft  She exhibits no distension and no mass  There is no tenderness  There is no rebound and no guarding  No hernia  Neurological: She is alert and oriented to person, place, and time  Skin: She is not diaphoretic  Psychiatric: She has a normal mood and affect  Her behavior is normal  Judgment and thought content normal    Vitals reviewed  Assessment/Plan:    S/p  5/15/20  Doing well, no complaints    Cervical dysplasia  Pap smear 10/28/2019: LSIL, HR HPV positive  Colposcopy 2019: AGGIE 1 at 0600 and 1200  Per ASCCP guidelines, patient needs repeat pap with co-testing in 1 year  Contraception  Discussed short and long acting contraception options with patient  She would like Mirena IUD for contraception  She is self pay - will need to be approved by [facility] program  Rosita oPwell in room to assist patient with filling out forms  She will be called when she is approved for placement   She declines any bridge at this time  States she is not sexually active          Torin Lee MD  7/9/2020  6:06 PM

## 2021-01-04 ENCOUNTER — ULTRASOUND (OUTPATIENT)
Dept: OBGYN CLINIC | Facility: CLINIC | Age: 36
End: 2021-01-04

## 2021-01-04 VITALS
SYSTOLIC BLOOD PRESSURE: 110 MMHG | BODY MASS INDEX: 30.15 KG/M2 | DIASTOLIC BLOOD PRESSURE: 71 MMHG | HEIGHT: 60 IN | HEART RATE: 84 BPM | WEIGHT: 153.6 LBS

## 2021-01-04 DIAGNOSIS — N91.2 AMENORRHEA: Primary | ICD-10-CM

## 2021-01-04 LAB — SL AMB POCT URINE HCG: POSITIVE

## 2021-01-04 PROCEDURE — 99213 OFFICE O/P EST LOW 20 MIN: CPT | Performed by: OBSTETRICS & GYNECOLOGY

## 2021-01-04 PROCEDURE — 81025 URINE PREGNANCY TEST: CPT | Performed by: OBSTETRICS & GYNECOLOGY

## 2021-01-04 NOTE — PROGRESS NOTES
OB/GYN VISIT  Ye Baca  2021  2:06 PM    Subjective:     Carmen Nguyen is a 38 y o   female who presents with concern for spotting during first trimester of pregnancy  Patient reports LMP 20, giving her GA of 8w5d today  Patient has not sought prenatal care yet for this pregnancy  This was an unintended but desired pregnancy  Patient states she took a pregnancy test 2 weeks ago, noted to be positive  She started experiencing vaginal bleeding 3 days ago, not soaking more than a panty liner  She has not needed to use a maxi pad  She reports cramping with the spotting as well  Last intercourse was 8 days ago  Objective:    Vitals: Blood pressure 110/71, pulse 84, height 5' (1 524 m), weight 69 7 kg (153 lb 9 6 oz), currently breastfeeding  Body mass index is 30 kg/m²  Transvaginal US:  GA 6w6d (average) ZACHARY 21  FHT 119bpm  Left adnexa measuring 2 67 x 1 61cm, unremarkable  Left adnexa unable to be visualized         exam: Multiparous appearing cervix, dark brown vaginal blood coating the vaginal vault and cervix requiring removal with 3 proctoswabs  No active bleeding from cervical os  Cervical os closed  Assessment/Plan:    Intrauterine pregnancy at 6w6d  Given discrepancy between her LMP and US today, will date based off ultrasound  She is currently 6w6d, giving her a ZACHARY 2021  Given vaginal bleeding and FHT of 119bpm today, will have her return in 2 weeks for repeat ultrasound     Continue prenatal vitamins  Will hold off on prenatal panel until next visit    Nuvia Mac MD  2021  2:06 PM

## 2021-01-04 NOTE — PATIENT INSTRUCTIONS
Embarazo   CUIDADO AMBULATORIO:   Lo qué necesita saber sobre el [facility]: Un embarazo normal dura alrededor de 40 semanas  El primer trimestre dura desde lozano último periodo hasta la semana 12 de [place]  El osito trimestre se extiende desde la semana 13 hasta la semana 23 de [place]  El tercer trimestre se extiende desde la semana 24 de embarazo hasta que nazca lozano bebé  Si usted conoce la fecha de lozano último periodo, lozano médico puede calcular la fecha de nacimiento de lozano bebé  Es posible que usted dé a ileana a lozano bebé en cualquier momento desde la semana 40 hasta 2 semanas después de la fecha calculada de [place]  Busque atención médica de inmediato si:  · Usted presenta un ajay dolor de ny que no desaparece  · Usted tiene cambios en la visión nuevos o en aumento, ila visión borrosa o con manchas  · Usted tiene inflamación nueva o creciente en lozano ganesh o adriana  · Usted tiene dolor o cólicos en el abdomen o la parte baja de la espalda  · Usted tiene sangrado vaginal     Llame a lozano médico u obstetra si:  · Usted tiene calambres, presión o tensión abdominal     · Usted tiene un cambio en la secreción vaginal     · Usted no puede retener alimentos ni líquidos y está perdiendo [place]  · Usted tiene escalofríos o fiebre  · Usted tiene comezón, ardor o dolor vaginal     · Usted tiene aysha secreción vaginal amarillenta, verdosa, rufus o de [employer]  · Usted tiene dolor o ardor al Liane [place], orina menos de lo habitual o tiene [place] rosada o sanguinolenta  · Usted tiene preguntas o inquietudes acerca de lozano condición o cuidado  Atención prenatal: El cuidado prenatal se trata de aysha serie de visitas con lozano médico a lo franny del embarazo  El cuidado prenatal puede ayudar a evitar problemas abhilash Melita Quinones y [place]  Abhilash cada visita prenatal, lozano médico la pesará y examinará lozano presión arterial  Lozano médico también examinará el Juan Manuel Bares y crecimiento de lozano bebé   Es posible que usted también necesite lo siguiente en algunas de ryan citas:  · Un examen pélvico le permite a lozano médico observar lozano felipe uterino (la parte inferior de lozano útero)  Lozano médico usará un espéculo para abrir la vagina  Jeanenne Andres tamaño y la forma de lozano [employer]  En lozano primera visita prenatal, es posible que [employer] alexsander aysha prueba de [place]  Claudia es un examen para detectar células anormales en el felipe uterino  · Los análisis de rebel podrían realizarse para buscar si hay signos de lo siguiente:     ? Diabetes gestacional o anemia (bajo nivel de jaky)    ? Tipo de rebel o factor Rh, o ciertos defectos congénitos    ? Inmunidad a ciertas enfermedades, ila la varicela o la rubéola    ? Aysha infección, ila aysha infección de transmisión sexual, VIH o hepatitis B    · La hepatitis B puede necesitar prevención o tratamiento  La hepatitis B es la inflamación del hígado causada por el virus de la hepatitis B (VHB)  El VHB puede transmitirse de Son a lozano bebé bashir el parto  Se le hará aysha prueba de detección del VHB lo antes posible bashir el primer trimestre de cada embarazo  Usted necesita la prueba incluso si recibió la vacuna contra la hepatitis B o si se la hicieron antes  Es posible que necesite que le traten aysha infección por el VHB antes de tashi a ileana  · Análisis de orina también podría realizarse para revisarle el azúcar y la proteína  Estas son señales de diabetes gestacional o preeclampsia  También podrían realizarle análisis de orina para revisar si hay signos de infección  · [place] ecografía del feto [place] imágenes del bebé dentro de lozano [employer]  Las imágenes se utilizan para verificar el desarrollo, el movimiento y la posición del bebé  · Se le pueden ofrecer pruebas de detección de trastornos genéticos  Estos exámenes revisan el riesgo de lozano bebé de trastornos genéticos ila el síndrome de [place]  Un examen de detección incluye análisis de rebel y un ultrasonido      Cambios corporales que pueden ocurrir bashir lozano embarazo:  · Los cambios en los senos que usted experimentará incluyen sensibilidad y cosquilleo bashir la primera parte de lozano [facility]  Los senos se volverán más grandes  Es posible que necesite un sostén con soporte  Es posible que usted tammy [facility] secreción delgada y FATOUMATA, conocida ila calostro, que sale de yran pezones bashir el osito trimestre  El calostro es un líquido que se convertirá en [place] alrededor de 3 días después de usted davida dado a ileana  · Cambios en la piel y estrías podrían ocurrir bashir lozano embarazo  Es posible que usted tenga marcas babin, conocidas ila estrías, en lozano piel  Las [employer] se desvanecen después del [facility]  Utilice crema si lozano piel está seca y con comezón  La piel de lozano ganesh, alrededor de los pezones y debajo de lozano ombligo podría oscurecerse  La mayoría del [place], lozano piel Christiano Messenger a lozano color normal después del nacimiento de lozano bebé  · El malestar matutino consiste en náuseas y vómitos que pueden ocurrir en cualquier momento del día  Evite los alimentos grasosos y picantes  Coma comidas pequeñas bashir el día en vez de porciones grandes  El jengibre puede ayudar a [employer]  Consulte con lozano médico acerca de otras formas para disminuir las náuseas y el vómito  · Acidez estomacal puede ser causada por los cambios hormonales bashir lozano [facility]  El [place] [employer] en crecimiento puede empujar lozano estómago hacia arriba y forzar ácido estomacal a acumularse dentro de lozano esófago  [place] 4 o 5 comidas pequeñas cada día en vez de comidas grandes  Evite los alimentos picantes  Evite comer bart antes de irse a la cama  · Estreñimiento puede desarrollarse bashir lozano embarazo  Para tratar el estreñimiento, coma alimentos altos en fibra ila cereales con fibra, frijoles, frutas, verduras, panes integrales y [place]  Marcene Salmons de Maria Del Rosario regular y tome suficiente agua   Es posible que lozano médico sugiera un suplemento con fibra para ablandar ryan evacuaciones intestinales  Consulte con lozano médico antes de usar cualquier medicamento para disminuir el estreñimiento  · Las hemorroides son Allyson Kroner grandes en el área rectal  Pueden causar dolor, comezón y sangrado de color heaton vivo en lozano recto  Para disminuir el riesgo de hemorroides, prevenga el estreñimiento y no se esfuerce cuando tenga aysha evacuación intestinal  Si usted tiene hemorroides, sumérjase en aysha bañera con agua tibia para aliviar la incomodidad  Consulte con lozano médico cómo puede tratar las hemorroides  · Los calambres y la hinchazón en las piernas pueden ser causados por niveles bajos de calcio o por el peso adicional del [facility]  Eleve ryan piernas por encima del nivel de lozano corazón para disminuir la hinchazón  Bashir un calambre en la pierna, estire o de un masaje al [employer] que tiene el calambre  El calor puede ayudar a disminuir el dolor y los espasmos musculares  Aplique calor sobre el músculo por 20 a 30 minutos cada 2 horas por la cantidad de días que se le indique  · Dolor en la espalda puede ocurrir a medida que lozano bebé crece  No esté de pie por largos periodos de tiempo ni levante objetos pesados  Use aysha buena postura mientras esté de pie, se agache o se doble  Use zapatos de tacón bajo con un buen soporte  Descansar puede también ayudarla a aliviar el dolor de espalda  Pregunte a lozano médico acerca de ejercicios que usted pueda hacer para fortalecer los músculos de lozano espalda  Manténgase saludable bashir lozano embarazo:  · Consuma alimentos saludables y variados  Alimentos saludables incluyen frutas, verduras, panes de bindu integral, alimentos lácteos bajos en grasa, frijoles, efren magras y pescado  [place] líquidos ila se le haya indicado  Pregunte cuánto líquido debe carroll cada día y cuáles líquidos son los más adecuados para usted  Limite el consumo de cafeína a menos de [place] [address]  Limite el consumo de pescado a 2 porciones cada semana  Escoja pescado con concentraciones bajas de edith ila atún ligero enlatado, camarón, cangrejo, salmón, bacalao o tilapia  No coma pescado con concentraciones altas de edith ila pez nahum, caballa gigante, pargo rayado y tiburón  · [id number] [address] [place]  Lozano necesidad de ciertas vitaminas y [address], ila el ácido fólico, aumenta bashir el [facility]  Las vitaminas prenatales proporcionan algunas de las vitaminas y minerales adicionales que usted necesita  Las vitaminas prenatales también podrían ayudar a disminuir el riesgo de ciertos defectos de nacimiento  · Pregunte cuánto peso usted debe aumentar bashir lozano embarazo  Demasiado aumento de peso o muy poco puede ser poco saludable para usted y lozano bebé  · Consulte con lozano médico acerca de hacer ejercicio  El ejercicio moderado puede ayudarla a mantenerse en forma  Lozano médico la ayudará a planear un programa de ejercicios que sea seguro para usted bashir lozano [facility]  · No fume  El tabaquismo aumenta el riesgo de un aborto espontáneo y de problemas cardíacos y vasculares  Fumar puede causar que lozano bebé nazca antes de tiempo o que pese menos al nacer  Deje de fumar tan pronto ila usted crea que podría estar embarazada  Solicite información a lozano médico si usted necesita ayuda para dejar de fumar  · No consuma alcohol  El alcohol pasa de lozano cuerpo al bebé a través de la placenta  Puede afectar el desarrollo del cerebro de loznao bebé y provocar el síndrome de alcoholismo fetal (SAF)  El SAF es un johanne de condiciones que causan problemas [place], de comportamiento y de crecimiento  · Consulte con lozano médico antes de carroll cualquier medicamento  Muchos medicamentos pueden perjudicar a lozano bebé si usted los perla [address]   No tome ningún medicamento, vitaminas, hierbas o suplementos sin jayy consultar con lozano médico  Nunca  use drogas ilegales o de la lawton (ila marihuana o cocaína) mientras está embarazada  Consejos de seguridad:  · Evite jacuzzis y saunas  No use un jacuzzi o un sauna mientras usted está embarazada, especialmente bashir el primer trimestre  [facility] y los saunas aumentan la temperatura de santiago bebé y el riesgo de defectos de nacimiento  · Evite la toxoplasmosis  [place] es aysha infección causada por comer carne cruda o estar cerca del excremento de un mini infectado  [place] puede causar malformaciones congénitas, aborto espontáneo y Mehran Schein  Lávese las adriana después de tocar carne cruda  Asegúrese de que la carne esté yonatan cocida antes de comerla  Evite los huevos crudos y la Tomás Townsend  Use guantes o pida que alguien la ayude a limpiar la caja de arena del mini mientras usted Lorna Szymanski  · Consulte con santiago médico acerca de viajar  El [facility] cómodo para viajar es bashir el osito trimestre  Pregunte a santiago médico si usted puede viajar después de las 36 semanas  Es posible que no pueda viajar en avión después de las [address] [facility]  También le puede recomendar que evite largos viajes por carretera  Acuda a ryan consultas de control con santiago médico u obstetra según le indicaron: [place] Dor[place] a todas ryan citas prenatales bashir santiago embarazo  Anote ryan preguntas para que se acuerde de hacerlas bashir ryan visitas  © Copyright [address] Information is for End User's use only and may not be sold, redistributed or otherwise used for commercial purposes  All illustrations and images included in CareNotes® are the copyrighted property of [facility]  or [address] [place] [address] es sólo para uso en educación  Santiago intención no es darle un consejo médico sobre enfermedades o tratamientos  Colsulte con santiago [facility] farmacéutico antes de seguir cualquier régimen médico para saber si es seguro y efectivo para usted

## 2021-01-04 NOTE — PROGRESS NOTES
Note on use of High Level Disinfection Process (Trophon) for transvaginal probe:   Eldon Rader   REF: [id number]   SN: [id number]     [address] [place] [address] [id number]

## 2024-12-10 NOTE — PROGRESS NOTES
Anesthesia Pre Eval Note    Anesthesia ROS/Med Hx    Overall Review:  EKG was reviewed, Echo was reviewed and Stress test was reviewed     Anesthetic Complication History:    Patient does not have a history of anesthetic complications      Pulmonary Review:  Patient does not have a pulmonary history      Neuro/Psych Review:       Positive for headaches  Positive for psychiatric history - Anxiety and Depression    Cardiovascular Review:   Exercise tolerance: poor (<4 METS)  NHYA Class: II    GI/HEPATIC/RENAL Review:     Positive for GERD  Positive for renal disease    End/Other Review:    Positive for chronic pain  Additional Results:  EKG:  Encounter Date: 12/08/24  -Electrocardiogram 12-Lead:        Result                      Value                           Ventricular Rate EKG/M*     80                              Atrial Rate (BPM)           80                              WI-Interval (MSEC)          132                             QRS-Interval (MSEC)         84                              QT-Interval (MSEC)          378                             QTc                         436                             P Axis (Degrees)            73                              R Axis (Degrees)            78                              T Axis (Degrees)            41                              REPORT TEXT                                             Sinus rhythm   with   premature supraventricular complexes   Cannot rule out   Anterior infarct   , age undetermined   Abnormal ECG   When compared with ECG of   08-JUN-2023 13:55,   No significant change was found   Confirmed by FABRICIO CANTU, [facility] ([id number]) on 12/8/2024 11:25:14 PM       ALLERGIES:  No Known Allergies   Last Labs        Component                Value               Date/Time                  WBC                      5.9                 12/08/2024 1924            RBC                      4.42                12/08/2024 1924            HGB                      13.1    Patient speaks [place] only  She was set up for her 1st diabetes education class  on 3/20/20 which she failed to show up for  Today's ultrasound shows normal interval growth and fluid  Patient was made aware of her ultrasound findings with the use of [facility] as my   She will see [facility] Stevens for diabetes education and nutrition and will be started on a glucometer  Recommend repeating her ultrasound for growth in 4 weeks  If 20% or more FBS are >95 or 2 hr pp are >120 she will be started on insulin or a oral hypoglycemic such as metformin or glyburide  If medications are required to control her diabetes she will require twice weekly fetal testing with NST's from 32 weeks on  I would also recommend delivery around her due date  If she does not require any medications to control her diabetes then she can start fetal testing at 40 weeks and be delivered by 41 weeks       Sheila Angel MD              2024            HCT                      40.3                2024            MCV                      91.2                2024            MCH                      29.6                2024            MCHC                     32.5                2024            RDW-CV                   12.3                2024            Sodium                   142                 2024            Potassium                4.3                 2024            Chloride                 104                 2024            Carbon Dioxide           28                  2024            Glucose                  83                  2024            BUN                      11                  2024            Creatinine               0.71                2024            Glomerular Filtrati*     >90                 2024            Calcium                  9.1                 2024            PLT                      151                 2024        Past Medical History:  No date: Anxiety  No date: Carpal tunnel syndrome, bilateral  No date: Depression  No date: Dry mouth  No date: Fibromyalgia  No date: Gastroesophageal reflux disease  No date: Insomnia  Past Surgical History:  2024: Breast biopsy; Left  2019:  section, low transverse  2019: Tubal ligation   Prior to Admission medications :  Medication Cholecalciferol (vitamin D3) 125 mcg (5,000 units) capsule, Sig Take 125 mcg by mouth daily., Start Date 7/15/24, End Date , Taking? Yes, Authorizing Provider Provider, Outside    Medication ibuprofen (MOTRIN) 800 MG tablet, Sig Take 800 mg by mouth every 6 hours as needed for Pain., Start Date 24, End Date , Taking? Yes, Authorizing Provider Provider, Outside    Medication gabapentin (NEURONTIN) 100 MG capsule, Sig Take 1 capsule by mouth in the  morning and 1 capsule at noon and 1 capsule in the evening., Start Date 11/15/24, End Date , Taking? Yes, Authorizing Provider Sergei Arvizu MD    Medication DULoxetine (CYMBALTA) 60 MG capsule, Sig Take 1 capsule by mouth daily., Start Date 10/31/24, End Date , Taking? Yes, Authorizing Provider Guy Sims,     Medication metoPROLOL tartrate (LOPRESSOR) 50 MG tablet, Sig Take 1 tablet by mouth in the morning and 1 tablet in the evening. Take 1 tablet the night before the CT, 1 tablet the morning of the CT, takes other 2 tablets with you to the test., Start Date 12/9/24, End Date , Taking? , Authorizing Provider Swathi Kam MD    Medication acetaminophen (TYLENOL) 500 MG tablet, Sig Take 500 mg by mouth every 4 hours as needed for Pain., Start Date , End Date , Taking? , Authorizing Provider Curtis Kinney    Medication diclofenac (VOLTAREN) 1 % gel, Sig Apply 4 g topically 4 times daily., Start Date 10/29/24, End Date , Taking? , Authorizing Provider Abigail Patiño MD    Medication meloxicam (MOBIC) 15 MG tablet, Sig Take 1 tablet by mouth daily., Start Date 10/29/24, End Date 11/28/24, Taking? , Authorizing Provider Abigail Patiño MD         Patient Vitals for the past 24 hrs:   BP Temp Temp src Pulse Resp SpO2   12/10/24 0757 118/70 36.7 °C (98 °F) Oral 75 16 100 %   12/10/24 0631 113/71 36.7 °C (98.1 °F) Oral 80 18 100 %   12/10/24 0040 -- -- -- -- 16 --   12/10/24 0016 106/68 36.8 °C (98.2 °F) Oral (!) 56 16 97 %   12/09/24 2304 108/73 36.6 °C (97.9 °F) Oral 67 -- 99 %   12/09/24 2255 (!) 142/87 -- -- 76 17 98 %   12/09/24 2024 108/76 36.3 °C (97.3 °F) Oral 90 18 98 %   12/09/24 1632 108/74 36.6 °C (97.9 °F) Oral 64 16 100 %   12/09/24 1130 130/84 37 °C (98.6 °F) Oral 80 16 99 %       Social history reviewed:  Social History     Tobacco Use   Smoking Status Every Day   • Current packs/day: 1.00   • Average packs/day: 1 pack/day for 23.9 years (23.9 ttl pk-yrs)   • Types: Cigarettes    • Start date: 2001   • Passive exposure: Current   Smokeless Tobacco Never        E-Cigarette/Vaping Substances & Devices   • E-Cigarette/Vaping Use Never Used    • Nicotine No    • THC No    • CBD No    • Flavoring No    • Disposable No    • Pre-filled or Refillable Cartridge No    • Refillable Tank No    • Pre-filled Pod No        Social History     Substance and Sexual Activity   Alcohol Use Not Currently     LEFT VENTRICLE:  Well visualized. The cavity size is normal. Wall thickness is  normal. There is no evidence of hypertrophy. Systolic function is normal.  The ejection fraction was measured by visual estimation. The ejection fraction  is 60%. The tissue Doppler parameters are normal. Left ventricular diastolic  function parameters are normal.     AORTIC VALVE:  Well visualized. The annulus is normal. The valve is  structurally normal. The valve is trileaflet. The leaflets are normal  thickness. Cusp separation is normal. Mobility is not restricted. Velocity is  within the normal range. There is no stenosis. There is no significant  regurgitation.     AORTA:  Aortic root: The root is normal-sized.  Ascending aorta: The vessel is normal-sized.     MITRAL VALVE:  Well visualized. The annulus is normal. The leaflets are mildly  thickened. Leaflet separation is normal. Mobility is not restricted.  Systolic  bowing without prolapse. Inflow velocity is within the normal range. There is  no evidence for stenosis. There is no significant regurgitation. The peak  diastolic gradient is 4mm Hg.     LEFT ATRIUM:  Well visualized. The atrium is normal in size.     RIGHT VENTRICLE:  The cavity size is dilated. Systolic function is normal. The  TAPSE is normal, suggestive of normal RV systolic function. The estimated peak  pressure is 26mm Hg.       The RV pressure during systole is 5mm Hg.     PULMONIC VALVE:   Not well visualized. There is no significant regurgitation.     TRICUSPID VALVE:  Well visualized. The valve is  structurally normal. Leaflet  separation is normal. Inflow velocity is within the normal range. There is no  evidence for stenosis. There is trivial regurgitation.     RIGHT ATRIUM:  Well visualized. The atrium is normal in size.     PERICARDIUM:   There is no pericardial effusion.     SYSTEMIC VEINS:  Inferior vena cava: The IVC is dilated.  Respirophasic diameter changes are  blunted (< 50%).   BASELINE ECG:   Sinus bradycardia.  STRESS ECG:  There was a mildly positive response with 0.5mm downsloping ST  depression in the inferior leads during recovery.  The stress ECG is  consistent with myocardial ischemia.   [facility] scoring: exercise time of 6 min 10  sec; maximum ST deviation of 0.5mm; angina present but did not limit exercise;  resulting score is 0. This score predicts a moderate risk of cardiac events.     STRESS RESULTS:   Maximal heart rate during stress was 127bpm (70% of maximal  predicted heart rate). The maximal predicted heart rate was 182bpm. The target  heart rate was 155bpm. The target heart rate was not achieved. There is a  normal resting blood pressure. The rate-pressure product for the peak heart  rate and blood pressure was 10475ar Hg/min. Functional capacity is above  normal (greater than 20%).       Relevant Problems   No relevant active problems       Physical Exam     Airway   Mallampati: II  TM Distance: >3 FB  Neck ROM: Full  Neck: Non-tender and Able to place in sniff position  TMJ Mobility: Good    Cardiovascular  Cardiovascular exam normal  Cardio Rhythm: Regular  Cardio Rate: Normal    Head Assessment  Head assessment: Normocephalic and Atraumatic    General Assessment  General Assessment: Alert and oriented and No acute distress    Dental Exam      Legend: C=Chipped  M=Missing  L=Loose B=Bridge Cr=[place] I=Implant    Pulmonary Exam  Pulmonary exam normal  Breath sounds clear to auscultation:  Yes    Abdominal Exam  Abdominal exam normal      Anesthesia Plan:    ASA Status:  2  Anesthesia Type: MAC    Induction: Intravenous  Preferred Airway Type: Nasal Cannula  Maintenance: TIVA  Premedication: None      Post-op Pain Management: Per Surgeon      Checklist  Reviewed: NPO Status, Allergies, Medications, Problem list, Past Med History, Patient Summary, Lab Results, Beta Blocker Status, Consultations, Outside Records and Care Everywhere  Consent/Risks Discussed Statement:  The proposed anesthetic plan, including its risks and benefits, have been discussed with the Patient along with the risks and benefits of alternatives. Questions were encouraged and answered and the patient and/or representative understands and agrees to proceed.    I have discussed elements of the patient's history or examination, as noted above and/or as follows, that place the patient at higher risk of complications; kidney disease.    I discussed with the patient (and/or patient's legal representative) the risks and benefits of the proposed anesthesia plan, MAC, which may include services performed by other anesthesia providers.    Alternative anesthesia plans, if available, were reviewed with the patient (and/or patient's legal representative). Discussion has been held with the patient (and/or patient's legal representative) regarding risks of anesthesia, which include Intra-operative Awareness, Nausea, Vomiting, Hypotension, Aspiration, allergic reaction, oral injury, sore throat and depressed breathing and emergent situations that may require change in anesthesia plan.    The patient (and/or patient's legal representative) has indicated understanding, his/her questions have been answered, and he/she wishes to proceed with the planned anesthetic.    Blood Products: Not Anticipated    Comments  Plan Comments: Monitored anesthesia care with general anesthesia back up was explained to patient/POA and family.  Risk of aspiration and hypoxemia during sedation was discussed.  Patient/POA and family understood the plan  and agreed to proceed.